# Patient Record
Sex: FEMALE | Race: WHITE
[De-identification: names, ages, dates, MRNs, and addresses within clinical notes are randomized per-mention and may not be internally consistent; named-entity substitution may affect disease eponyms.]

---

## 2019-04-23 ENCOUNTER — APPOINTMENT (OUTPATIENT)
Dept: ANTEPARTUM | Facility: CLINIC | Age: 34
End: 2019-04-23
Payer: COMMERCIAL

## 2019-04-23 PROBLEM — Z00.00 ENCOUNTER FOR PREVENTIVE HEALTH EXAMINATION: Status: ACTIVE | Noted: 2019-04-23

## 2019-04-23 PROCEDURE — 76814 OB US NUCHAL MEAS ADD-ON: CPT

## 2019-04-23 PROCEDURE — 36415 COLL VENOUS BLD VENIPUNCTURE: CPT

## 2019-04-23 PROCEDURE — 76817 TRANSVAGINAL US OBSTETRIC: CPT

## 2019-04-23 PROCEDURE — 76813 OB US NUCHAL MEAS 1 GEST: CPT

## 2019-05-21 ENCOUNTER — APPOINTMENT (OUTPATIENT)
Dept: ANTEPARTUM | Facility: CLINIC | Age: 34
End: 2019-05-21
Payer: COMMERCIAL

## 2019-05-21 PROCEDURE — 76817 TRANSVAGINAL US OBSTETRIC: CPT

## 2019-05-21 PROCEDURE — 76812 OB US DETAILED ADDL FETUS: CPT | Mod: 59

## 2019-05-21 PROCEDURE — 76811 OB US DETAILED SNGL FETUS: CPT

## 2019-07-01 ENCOUNTER — APPOINTMENT (OUTPATIENT)
Dept: ANTEPARTUM | Facility: CLINIC | Age: 34
End: 2019-07-01
Payer: COMMERCIAL

## 2019-07-01 PROCEDURE — 76817 TRANSVAGINAL US OBSTETRIC: CPT

## 2019-07-01 PROCEDURE — 76810 OB US >/= 14 WKS ADDL FETUS: CPT

## 2019-07-01 PROCEDURE — 76805 OB US >/= 14 WKS SNGL FETUS: CPT

## 2019-07-31 ENCOUNTER — APPOINTMENT (OUTPATIENT)
Dept: ANTEPARTUM | Facility: CLINIC | Age: 34
End: 2019-07-31
Payer: COMMERCIAL

## 2019-07-31 PROCEDURE — 76820 UMBILICAL ARTERY ECHO: CPT | Mod: 59

## 2019-07-31 PROCEDURE — 76819 FETAL BIOPHYS PROFIL W/O NST: CPT

## 2019-07-31 PROCEDURE — 76816 OB US FOLLOW-UP PER FETUS: CPT

## 2019-08-14 ENCOUNTER — APPOINTMENT (OUTPATIENT)
Dept: ANTEPARTUM | Facility: CLINIC | Age: 34
End: 2019-08-14
Payer: COMMERCIAL

## 2019-08-14 PROCEDURE — 76816 OB US FOLLOW-UP PER FETUS: CPT

## 2019-08-14 PROCEDURE — 76817 TRANSVAGINAL US OBSTETRIC: CPT

## 2019-08-14 PROCEDURE — 76819 FETAL BIOPHYS PROFIL W/O NST: CPT | Mod: 59

## 2019-08-28 ENCOUNTER — APPOINTMENT (OUTPATIENT)
Dept: ANTEPARTUM | Facility: CLINIC | Age: 34
End: 2019-08-28
Payer: COMMERCIAL

## 2019-08-28 PROCEDURE — 76819 FETAL BIOPHYS PROFIL W/O NST: CPT | Mod: 59

## 2019-08-28 PROCEDURE — 76817 TRANSVAGINAL US OBSTETRIC: CPT

## 2019-08-28 PROCEDURE — 76816 OB US FOLLOW-UP PER FETUS: CPT | Mod: 59

## 2019-09-11 ENCOUNTER — APPOINTMENT (OUTPATIENT)
Dept: ANTEPARTUM | Facility: CLINIC | Age: 34
End: 2019-09-11
Payer: COMMERCIAL

## 2019-09-11 PROCEDURE — 76816 OB US FOLLOW-UP PER FETUS: CPT

## 2019-09-18 ENCOUNTER — APPOINTMENT (OUTPATIENT)
Dept: ANTEPARTUM | Facility: CLINIC | Age: 34
End: 2019-09-18
Payer: COMMERCIAL

## 2019-09-18 PROCEDURE — 76817 TRANSVAGINAL US OBSTETRIC: CPT

## 2019-09-18 PROCEDURE — 76816 OB US FOLLOW-UP PER FETUS: CPT

## 2019-09-21 ENCOUNTER — INPATIENT (INPATIENT)
Facility: HOSPITAL | Age: 34
LOS: 4 days | Discharge: HOME | End: 2019-09-26
Attending: OBSTETRICS & GYNECOLOGY | Admitting: OBSTETRICS & GYNECOLOGY
Payer: COMMERCIAL

## 2019-09-21 ENCOUNTER — RESULT REVIEW (OUTPATIENT)
Age: 34
End: 2019-09-21

## 2019-09-21 VITALS — WEIGHT: 159.39 LBS | HEIGHT: 62 IN

## 2019-09-21 DIAGNOSIS — Z3A.00 WEEKS OF GESTATION OF PREGNANCY NOT SPECIFIED: ICD-10-CM

## 2019-09-21 DIAGNOSIS — O26.899 OTHER SPECIFIED PREGNANCY RELATED CONDITIONS, UNSPECIFIED TRIMESTER: ICD-10-CM

## 2019-09-21 LAB
ALBUMIN SERPL ELPH-MCNC: 3.3 G/DL — SIGNIFICANT CHANGE UP (ref 3.3–5)
ALP SERPL-CCNC: 205 U/L — HIGH (ref 40–120)
ALT FLD-CCNC: 21 U/L — SIGNIFICANT CHANGE UP (ref 10–45)
AMNISURE ROM (RUPTURE OF MEMBRANES): POSITIVE
ANION GAP SERPL CALC-SCNC: 12 MMOL/L — SIGNIFICANT CHANGE UP (ref 5–17)
APPEARANCE UR: CLEAR — SIGNIFICANT CHANGE UP
APTT BLD: 26.3 SEC — LOW (ref 27.5–36.3)
AST SERPL-CCNC: 26 U/L — SIGNIFICANT CHANGE UP (ref 10–40)
BASOPHILS # BLD AUTO: 0.04 K/UL — SIGNIFICANT CHANGE UP (ref 0–0.2)
BASOPHILS NFR BLD AUTO: 0.4 % — SIGNIFICANT CHANGE UP (ref 0–2)
BILIRUB SERPL-MCNC: 0.2 MG/DL — SIGNIFICANT CHANGE UP (ref 0.2–1.2)
BILIRUB UR-MCNC: NEGATIVE — SIGNIFICANT CHANGE UP
BLD GP AB SCN SERPL QL: NEGATIVE — SIGNIFICANT CHANGE UP
BUN SERPL-MCNC: 7 MG/DL — SIGNIFICANT CHANGE UP (ref 7–23)
CALCIUM SERPL-MCNC: 9.2 MG/DL — SIGNIFICANT CHANGE UP (ref 8.4–10.5)
CHLORIDE SERPL-SCNC: 106 MMOL/L — SIGNIFICANT CHANGE UP (ref 96–108)
CO2 SERPL-SCNC: 19 MMOL/L — LOW (ref 22–31)
COLOR SPEC: YELLOW — SIGNIFICANT CHANGE UP
CREAT ?TM UR-MCNC: 129 MG/DL — SIGNIFICANT CHANGE UP
CREAT SERPL-MCNC: 0.56 MG/DL — SIGNIFICANT CHANGE UP (ref 0.5–1.3)
DIFF PNL FLD: NEGATIVE — SIGNIFICANT CHANGE UP
EOSINOPHIL # BLD AUTO: 0.09 K/UL — SIGNIFICANT CHANGE UP (ref 0–0.5)
EOSINOPHIL NFR BLD AUTO: 1 % — SIGNIFICANT CHANGE UP (ref 0–6)
FIBRINOGEN PPP-MCNC: 446 MG/DL — HIGH (ref 258–438)
GLUCOSE SERPL-MCNC: 88 MG/DL — SIGNIFICANT CHANGE UP (ref 70–99)
GLUCOSE UR QL: NEGATIVE — SIGNIFICANT CHANGE UP
HCT VFR BLD CALC: 40.4 % — SIGNIFICANT CHANGE UP (ref 34.5–45)
HGB BLD-MCNC: 12.8 G/DL — SIGNIFICANT CHANGE UP (ref 11.5–15.5)
IMM GRANULOCYTES NFR BLD AUTO: 0.3 % — SIGNIFICANT CHANGE UP (ref 0–1.5)
INR BLD: 0.96 — SIGNIFICANT CHANGE UP (ref 0.88–1.16)
KETONES UR-MCNC: NEGATIVE — SIGNIFICANT CHANGE UP
LDH SERPL L TO P-CCNC: 155 U/L — SIGNIFICANT CHANGE UP (ref 50–242)
LEUKOCYTE ESTERASE UR-ACNC: NEGATIVE — SIGNIFICANT CHANGE UP
LYMPHOCYTES # BLD AUTO: 1.69 K/UL — SIGNIFICANT CHANGE UP (ref 1–3.3)
LYMPHOCYTES # BLD AUTO: 18.4 % — SIGNIFICANT CHANGE UP (ref 13–44)
MAGNESIUM SERPL-MCNC: 4.6 MG/DL — HIGH (ref 1.6–2.6)
MCHC RBC-ENTMCNC: 25 PG — LOW (ref 27–34)
MCHC RBC-ENTMCNC: 31.7 GM/DL — LOW (ref 32–36)
MCV RBC AUTO: 78.9 FL — LOW (ref 80–100)
MONOCYTES # BLD AUTO: 0.79 K/UL — SIGNIFICANT CHANGE UP (ref 0–0.9)
MONOCYTES NFR BLD AUTO: 8.6 % — SIGNIFICANT CHANGE UP (ref 2–14)
NEUTROPHILS # BLD AUTO: 6.53 K/UL — SIGNIFICANT CHANGE UP (ref 1.8–7.4)
NEUTROPHILS NFR BLD AUTO: 71.3 % — SIGNIFICANT CHANGE UP (ref 43–77)
NITRITE UR-MCNC: NEGATIVE — SIGNIFICANT CHANGE UP
NRBC # BLD: 0 /100 WBCS — SIGNIFICANT CHANGE UP (ref 0–0)
PH UR: 6 — SIGNIFICANT CHANGE UP (ref 5–8)
PLATELET # BLD AUTO: 186 K/UL — SIGNIFICANT CHANGE UP (ref 150–400)
POTASSIUM SERPL-MCNC: 3.9 MMOL/L — SIGNIFICANT CHANGE UP (ref 3.5–5.3)
POTASSIUM SERPL-SCNC: 3.9 MMOL/L — SIGNIFICANT CHANGE UP (ref 3.5–5.3)
PROT ?TM UR-MCNC: 15 MG/DL — HIGH (ref 0–12)
PROT SERPL-MCNC: 6.3 G/DL — SIGNIFICANT CHANGE UP (ref 6–8.3)
PROT UR-MCNC: NEGATIVE MG/DL — SIGNIFICANT CHANGE UP
PROT/CREAT UR-RTO: 0.1 RATIO — SIGNIFICANT CHANGE UP (ref 0–0.2)
PROTHROM AB SERPL-ACNC: 10.8 SEC — SIGNIFICANT CHANGE UP (ref 10–12.9)
RBC # BLD: 5.12 M/UL — SIGNIFICANT CHANGE UP (ref 3.8–5.2)
RBC # FLD: 16.1 % — HIGH (ref 10.3–14.5)
RH IG SCN BLD-IMP: POSITIVE — SIGNIFICANT CHANGE UP
SODIUM SERPL-SCNC: 137 MMOL/L — SIGNIFICANT CHANGE UP (ref 135–145)
SP GR SPEC: 1.02 — SIGNIFICANT CHANGE UP (ref 1–1.03)
T PALLIDUM AB TITR SER: NEGATIVE — SIGNIFICANT CHANGE UP
URATE SERPL-MCNC: 5.1 MG/DL — SIGNIFICANT CHANGE UP (ref 2.5–7)
UROBILINOGEN FLD QL: 0.2 E.U./DL — SIGNIFICANT CHANGE UP
WBC # BLD: 9.17 K/UL — SIGNIFICANT CHANGE UP (ref 3.8–10.5)
WBC # FLD AUTO: 9.17 K/UL — SIGNIFICANT CHANGE UP (ref 3.8–10.5)

## 2019-09-21 RX ORDER — NALOXONE HYDROCHLORIDE 4 MG/.1ML
0.1 SPRAY NASAL
Refills: 0 | Status: DISCONTINUED | OUTPATIENT
Start: 2019-09-21 | End: 2019-09-21

## 2019-09-21 RX ORDER — CEFAZOLIN SODIUM 1 G
2000 VIAL (EA) INJECTION ONCE
Refills: 0 | Status: COMPLETED | OUTPATIENT
Start: 2019-09-21 | End: 2019-09-21

## 2019-09-21 RX ORDER — OXYTOCIN 10 UNIT/ML
333.33 VIAL (ML) INJECTION
Qty: 20 | Refills: 0 | Status: DISCONTINUED | OUTPATIENT
Start: 2019-09-21 | End: 2019-09-26

## 2019-09-21 RX ORDER — NIFEDIPINE 30 MG
30 TABLET, EXTENDED RELEASE 24 HR ORAL DAILY
Refills: 0 | Status: DISCONTINUED | OUTPATIENT
Start: 2019-09-21 | End: 2019-09-22

## 2019-09-21 RX ORDER — SODIUM CHLORIDE 9 MG/ML
1000 INJECTION, SOLUTION INTRAVENOUS ONCE
Refills: 0 | Status: COMPLETED | OUTPATIENT
Start: 2019-09-21 | End: 2019-09-21

## 2019-09-21 RX ORDER — SODIUM CHLORIDE 9 MG/ML
1000 INJECTION, SOLUTION INTRAVENOUS
Refills: 0 | Status: DISCONTINUED | OUTPATIENT
Start: 2019-09-21 | End: 2019-09-22

## 2019-09-21 RX ORDER — METOCLOPRAMIDE HCL 10 MG
10 TABLET ORAL ONCE
Refills: 0 | Status: DISCONTINUED | OUTPATIENT
Start: 2019-09-21 | End: 2019-09-21

## 2019-09-21 RX ORDER — OXYCODONE HYDROCHLORIDE 5 MG/1
5 TABLET ORAL ONCE
Refills: 0 | Status: DISCONTINUED | OUTPATIENT
Start: 2019-09-21 | End: 2019-09-26

## 2019-09-21 RX ORDER — LANOLIN
1 OINTMENT (GRAM) TOPICAL EVERY 6 HOURS
Refills: 0 | Status: DISCONTINUED | OUTPATIENT
Start: 2019-09-21 | End: 2019-09-26

## 2019-09-21 RX ORDER — DOCUSATE SODIUM 100 MG
100 CAPSULE ORAL
Refills: 0 | Status: DISCONTINUED | OUTPATIENT
Start: 2019-09-21 | End: 2019-09-26

## 2019-09-21 RX ORDER — LABETALOL HCL 100 MG
20 TABLET ORAL ONCE
Refills: 0 | Status: COMPLETED | OUTPATIENT
Start: 2019-09-21 | End: 2019-09-21

## 2019-09-21 RX ORDER — MORPHINE SULFATE 50 MG/1
0.2 CAPSULE, EXTENDED RELEASE ORAL ONCE
Refills: 0 | Status: DISCONTINUED | OUTPATIENT
Start: 2019-09-21 | End: 2019-09-21

## 2019-09-21 RX ORDER — IBUPROFEN 200 MG
600 TABLET ORAL EVERY 6 HOURS
Refills: 0 | Status: COMPLETED | OUTPATIENT
Start: 2019-09-21 | End: 2020-08-19

## 2019-09-21 RX ORDER — DIPHENHYDRAMINE HCL 50 MG
25 CAPSULE ORAL EVERY 6 HOURS
Refills: 0 | Status: DISCONTINUED | OUTPATIENT
Start: 2019-09-21 | End: 2019-09-26

## 2019-09-21 RX ORDER — PENICILLIN G POTASSIUM 5000000 [IU]/1
5 POWDER, FOR SOLUTION INTRAMUSCULAR; INTRAPLEURAL; INTRATHECAL; INTRAVENOUS ONCE
Refills: 0 | Status: COMPLETED | OUTPATIENT
Start: 2019-09-21 | End: 2019-09-21

## 2019-09-21 RX ORDER — ONDANSETRON 8 MG/1
4 TABLET, FILM COATED ORAL EVERY 6 HOURS
Refills: 0 | Status: DISCONTINUED | OUTPATIENT
Start: 2019-09-21 | End: 2019-09-26

## 2019-09-21 RX ORDER — MAGNESIUM SULFATE 500 MG/ML
2 VIAL (ML) INJECTION
Qty: 40 | Refills: 0 | Status: DISCONTINUED | OUTPATIENT
Start: 2019-09-21 | End: 2019-09-22

## 2019-09-21 RX ORDER — PENICILLIN G POTASSIUM 5000000 [IU]/1
2.5 POWDER, FOR SOLUTION INTRAMUSCULAR; INTRAPLEURAL; INTRATHECAL; INTRAVENOUS EVERY 4 HOURS
Refills: 0 | Status: DISCONTINUED | OUTPATIENT
Start: 2019-09-21 | End: 2019-09-21

## 2019-09-21 RX ORDER — SIMETHICONE 80 MG/1
80 TABLET, CHEWABLE ORAL EVERY 4 HOURS
Refills: 0 | Status: DISCONTINUED | OUTPATIENT
Start: 2019-09-21 | End: 2019-09-26

## 2019-09-21 RX ORDER — PENICILLIN G POTASSIUM 5000000 [IU]/1
POWDER, FOR SOLUTION INTRAMUSCULAR; INTRAPLEURAL; INTRATHECAL; INTRAVENOUS
Refills: 0 | Status: DISCONTINUED | OUTPATIENT
Start: 2019-09-21 | End: 2019-09-21

## 2019-09-21 RX ORDER — HEPARIN SODIUM 5000 [USP'U]/ML
5000 INJECTION INTRAVENOUS; SUBCUTANEOUS EVERY 12 HOURS
Refills: 0 | Status: DISCONTINUED | OUTPATIENT
Start: 2019-09-22 | End: 2019-09-26

## 2019-09-21 RX ORDER — ACETAMINOPHEN 500 MG
975 TABLET ORAL
Refills: 0 | Status: DISCONTINUED | OUTPATIENT
Start: 2019-09-21 | End: 2019-09-26

## 2019-09-21 RX ORDER — CITRIC ACID/SODIUM CITRATE 300-500 MG
30 SOLUTION, ORAL ORAL ONCE
Refills: 0 | Status: COMPLETED | OUTPATIENT
Start: 2019-09-21 | End: 2019-09-21

## 2019-09-21 RX ORDER — OXYTOCIN 10 UNIT/ML
333.33 VIAL (ML) INJECTION
Qty: 20 | Refills: 0 | Status: DISCONTINUED | OUTPATIENT
Start: 2019-09-21 | End: 2019-09-21

## 2019-09-21 RX ORDER — LABETALOL HCL 100 MG
100 TABLET ORAL EVERY 8 HOURS
Refills: 0 | Status: DISCONTINUED | OUTPATIENT
Start: 2019-09-21 | End: 2019-09-22

## 2019-09-21 RX ORDER — SODIUM CHLORIDE 9 MG/ML
1000 INJECTION, SOLUTION INTRAVENOUS
Refills: 0 | Status: DISCONTINUED | OUTPATIENT
Start: 2019-09-21 | End: 2019-09-21

## 2019-09-21 RX ORDER — MAGNESIUM HYDROXIDE 400 MG/1
30 TABLET, CHEWABLE ORAL
Refills: 0 | Status: DISCONTINUED | OUTPATIENT
Start: 2019-09-21 | End: 2019-09-26

## 2019-09-21 RX ORDER — AZITHROMYCIN 500 MG/1
500 TABLET, FILM COATED ORAL ONCE
Refills: 0 | Status: COMPLETED | OUTPATIENT
Start: 2019-09-21 | End: 2019-09-21

## 2019-09-21 RX ORDER — GLYCERIN ADULT
1 SUPPOSITORY, RECTAL RECTAL AT BEDTIME
Refills: 0 | Status: DISCONTINUED | OUTPATIENT
Start: 2019-09-21 | End: 2019-09-26

## 2019-09-21 RX ORDER — FERROUS SULFATE 325(65) MG
325 TABLET ORAL DAILY
Refills: 0 | Status: DISCONTINUED | OUTPATIENT
Start: 2019-09-21 | End: 2019-09-26

## 2019-09-21 RX ORDER — DEXAMETHASONE 0.5 MG/5ML
4 ELIXIR ORAL EVERY 6 HOURS
Refills: 0 | Status: DISCONTINUED | OUTPATIENT
Start: 2019-09-21 | End: 2019-09-21

## 2019-09-21 RX ORDER — TETANUS TOXOID, REDUCED DIPHTHERIA TOXOID AND ACELLULAR PERTUSSIS VACCINE, ADSORBED 5; 2.5; 8; 8; 2.5 [IU]/.5ML; [IU]/.5ML; UG/.5ML; UG/.5ML; UG/.5ML
0.5 SUSPENSION INTRAMUSCULAR ONCE
Refills: 0 | Status: DISCONTINUED | OUTPATIENT
Start: 2019-09-21 | End: 2019-09-26

## 2019-09-21 RX ORDER — KETOROLAC TROMETHAMINE 30 MG/ML
30 SYRINGE (ML) INJECTION EVERY 6 HOURS
Refills: 0 | Status: DISCONTINUED | OUTPATIENT
Start: 2019-09-21 | End: 2019-09-22

## 2019-09-21 RX ORDER — MAGNESIUM SULFATE 500 MG/ML
4 VIAL (ML) INJECTION ONCE
Refills: 0 | Status: COMPLETED | OUTPATIENT
Start: 2019-09-21 | End: 2019-09-21

## 2019-09-21 RX ORDER — FAMOTIDINE 10 MG/ML
20 INJECTION INTRAVENOUS ONCE
Refills: 0 | Status: COMPLETED | OUTPATIENT
Start: 2019-09-21 | End: 2019-09-21

## 2019-09-21 RX ORDER — OXYCODONE HYDROCHLORIDE 5 MG/1
5 TABLET ORAL
Refills: 0 | Status: COMPLETED | OUTPATIENT
Start: 2019-09-21 | End: 2019-09-28

## 2019-09-21 RX ADMIN — Medication 30 MILLILITER(S): at 09:49

## 2019-09-21 RX ADMIN — PENICILLIN G POTASSIUM 100 MILLION UNIT(S): 5000000 POWDER, FOR SOLUTION INTRAMUSCULAR; INTRAPLEURAL; INTRATHECAL; INTRAVENOUS at 07:15

## 2019-09-21 RX ADMIN — Medication 200 GRAM(S): at 14:10

## 2019-09-21 RX ADMIN — FAMOTIDINE 20 MILLIGRAM(S): 10 INJECTION INTRAVENOUS at 09:35

## 2019-09-21 RX ADMIN — Medication 100 MILLIGRAM(S): at 09:37

## 2019-09-21 RX ADMIN — SODIUM CHLORIDE 2000 MILLILITER(S): 9 INJECTION, SOLUTION INTRAVENOUS at 07:27

## 2019-09-21 RX ADMIN — Medication 12 MILLIGRAM(S): at 06:56

## 2019-09-21 RX ADMIN — AZITHROMYCIN 255 MILLIGRAM(S): 500 TABLET, FILM COATED ORAL at 09:48

## 2019-09-21 RX ADMIN — Medication 975 MILLIGRAM(S): at 21:26

## 2019-09-21 RX ADMIN — Medication 20 MILLIGRAM(S): at 13:55

## 2019-09-21 RX ADMIN — Medication 50 GM/HR: at 14:35

## 2019-09-21 RX ADMIN — Medication 30 MILLIGRAM(S): at 19:30

## 2019-09-21 RX ADMIN — Medication 975 MILLIGRAM(S): at 23:00

## 2019-09-21 RX ADMIN — SODIUM CHLORIDE 1000 MILLILITER(S): 9 INJECTION, SOLUTION INTRAVENOUS at 06:56

## 2019-09-21 NOTE — PROGRESS NOTE ADULT - SUBJECTIVE AND OBJECTIVE BOX
Patient evaluated at bedside for clinical magnesium check. Serum magnesium to be drawn at 20:00 and was found to be 4.6.  She denies visual disturbances including scotoma, headache and right upper quadrant pain. Also denies nausea/vomiting/epigastric pain/shortness of breath. Pain well controlled.      T(C): 36.4 (19 @ 01:30), Max: 36.6 (19 @ 21:28)  HR: 75 (19 @ 03:30) (75 - 100)  BP: 99/64 (19 @ 03:30) (99/64 - 124/71)  RR: 16 (19 @ 03:30) (16 - 19)  SpO2: 95% (19 @ 03:30) (95% - 96%)  Wt(kg): --  Daily Height in cm: 157.48 (21 Sep 2019 07:33)    Daily Weight Pre-pregnancy in k (21 Sep 2019 07:33)     @ 07:01  -   @ 04:23  --------------------------------------------------------  IN: 2450 mL / OUT: 4775 mL / NET: -2325 mL      Gen: NAD, AAOx3  CV: RRR, no M/R/G  Pulm: CTAB, no R/R/W  Abd: soft, nontender, no rebound or guarding, no epigastric tenderness, liver nonpalpable +BS, fundus palpated at umbilicus.  : Carreno in place, mildly bloody urine but good output  Ext: +1 edema becca, SCDs in place, Reflexes +2                          12.8   9.17  )-----------( 186      ( 21 Sep 2019 06:36 )             40.4         137  |  106  |  7   ----------------------------<  88  3.9   |  19<L>  |  0.56    Ca    9.2      21 Sep 2019 06:36  Mg     4.6         TPro  6.3  /  Alb  3.3  /  TBili  0.2  /  DBili  x   /  AST  26  /  ALT  21  /  AlkPhos  205<H>      acetaminophen   Tablet .. 975 milliGRAM(s) Oral <User Schedule>  diphenhydrAMINE 25 milliGRAM(s) Oral every 6 hours PRN  diphtheria/tetanus/pertussis (acellular) Vaccine (ADAcel) 0.5 milliLiter(s) IntraMuscular once  docusate sodium 100 milliGRAM(s) Oral two times a day PRN  ferrous    sulfate 325 milliGRAM(s) Oral daily  glycerin Suppository - Adult 1 Suppository(s) Rectal at bedtime PRN  heparin  Injectable 5000 Unit(s) SubCutaneous every 12 hours  ibuprofen  Tablet. 600 milliGRAM(s) Oral every 6 hours  ketorolac   Injectable 30 milliGRAM(s) IV Push every 6 hours  labetalol 100 milliGRAM(s) Oral every 8 hours  lactated ringers. 1000 milliLiter(s) IV Continuous <Continuous>  lanolin Ointment 1 Application(s) Topical every 6 hours PRN  magnesium hydroxide Suspension 30 milliLiter(s) Oral two times a day PRN  magnesium sulfate Infusion 2 Gm/Hr IV Continuous <Continuous>  NIFEdipine XL 30 milliGRAM(s) Oral daily  ondansetron Injectable 4 milliGRAM(s) IV Push every 6 hours PRN  oxyCODONE    IR 5 milliGRAM(s) Oral every 3 hours PRN  oxyCODONE    IR 5 milliGRAM(s) Oral once PRN  oxytocin Infusion 333.333 milliUNIT(s)/Min IV Continuous <Continuous>  prenatal multivitamin 1 Tablet(s) Oral daily  simethicone 80 milliGRAM(s) Chew every 4 hours PRN      19 @ 07:  -  19 @ 07:00  --------------------------------------------------------  IN: 2000 mL / OUT: 0 mL / NET: 2000 mL    19 @ 07:01  -  19 @ 04:23  --------------------------------------------------------  IN: 2450 mL / OUT: 4775 mL / NET: -9957 mL

## 2019-09-21 NOTE — CONSULT NOTE ADULT - ASSESSMENT
patient is a 34 y o female  who was admitted 2nd to premature rupture of membranes. She delivered twins via C section today.   Nephrology consult placed in regards to preeclampsia with severe features.    Preeclampsia with severe features  s/p IV labetalol + immediate release nifedipine   on mag drip   s/p c section 2nd to rupture of membranes  recommend starting patient on labetalol 100 mg po q 8hrs with holding parameters (do not five if BP < 120/80)  in the meantime, recommend nifedipine 30 mg po daily  goal -140/80-90  will continue monitoring BP

## 2019-09-21 NOTE — PROGRESS NOTE ADULT - SUBJECTIVE AND OBJECTIVE BOX
patient seen in recovery room for severe range bps. patient denies HA, scotoma, RUQ/epigastric pain. reflexes WNL. Will give IV push labetalol 20 mg now , start IV Mg for seizure prophylaxis, consult nephrology.     Vital Signs Last 24 Hrs  T(C): 36.6 (21 Sep 2019 12:30), Max: 36.6 (21 Sep 2019 12:30)  T(F): 97.9 (21 Sep 2019 12:30), Max: 97.9 (21 Sep 2019 12:30)  HR: 90 (21 Sep 2019 13:15) (84 - 92)  BP: 161/83 (21 Sep 2019 13:15) (152/80 - 161/83)  BP(mean): --  RR: 16 (21 Sep 2019 13:15) (16 - 17)  SpO2: 95% (21 Sep 2019 13:15) (92% - 96%)    no ruq tenderness  reflexes 2+ bilaterally

## 2019-09-21 NOTE — PROGRESS NOTE ADULT - ASSESSMENT
A&P:   34y  s/p c/s  complicated by preeclampsia with severe features    1. Preeclampsia: Continue IV Magnesium @2G/hr for 24 hrs post delivery.  No complaints currently.   Antihypertensives: Labet  Continue to monitor blood pressures  Next Magnesium check @   Follow up on Magnesium serum level     2. GI: Clears, until Magnesium is stopped    3. : strict Is and Os, D/C verma after discontinuation of IV Magnesium A&P:   34y  s/p c/s  complicated by preeclampsia with severe features    1. Preeclampsia: Continue IV Magnesium @2G/hr for 24 hrs post delivery.  No complaints currently.   Antihypertensives: Labetalol 100 TID, Nifedipine 30XL daily  Continue to monitor blood pressures  Next Magnesium check @ 2:00  Follow up on Magnesium serum level     2. GI: Clears, until Magnesium is stopped    3. : strict Is and Os, D/C verma after discontinuation of IV Magnesium

## 2019-09-21 NOTE — CONSULT NOTE ADULT - SUBJECTIVE AND OBJECTIVE BOX
Renal consult  patient is a 34 y o female  who was admitted 2nd to premature rupture of membranes. She delivered twins via C section today.   Nephrology consult placed in regards to preeclampsia with severe features.  per Ob team, Bp readings were good up until post delivery, when it started rising in the 170s.   She did receive labetalol 20 mg IV @ 1:55 pm  when Bp @ 170/81, then 20 mg IV @ 2:25 pm when BP @ 169/70  She also received nifedipine 30 mg immediate release   most recent Bp @ 121/63.  Patient denies any prior h.o HTN or preeclampsia even during her prior pregnancy.   She denies fh of preeclampsia.   She denies headaches, blurry vision, RUQ abd pain,   She has been started on mag drip.   she denies smoking  She is opting for breastfeeding          PAST MEDICAL & SURGICAL HISTORY:      Allergies    No Known Allergies    Intolerances        FAMILY HISTORY:NA       SOCIAL HISTORY: NA     MEDICATIONS  (STANDING):  acetaminophen   Tablet .. 975 milliGRAM(s) Oral <User Schedule>  diphtheria/tetanus/pertussis (acellular) Vaccine (ADAcel) 0.5 milliLiter(s) IntraMuscular once  ferrous    sulfate 325 milliGRAM(s) Oral daily  ibuprofen  Tablet. 600 milliGRAM(s) Oral every 6 hours  ketorolac   Injectable 30 milliGRAM(s) IV Push every 6 hours  labetalol 100 milliGRAM(s) Oral every 8 hours  lactated ringers. 1000 milliLiter(s) (125 mL/Hr) IV Continuous <Continuous>  magnesium sulfate Infusion 2 Gm/Hr (50 mL/Hr) IV Continuous <Continuous>  morphine PF Spinal 0.2 milliGRAM(s) IntraThecal. once  NIFEdipine XL 30 milliGRAM(s) Oral daily  oxytocin Infusion 333.333 milliUNIT(s)/Min (1000 mL/Hr) IV Continuous <Continuous>  prenatal multivitamin 1 Tablet(s) Oral daily    MEDICATIONS  (PRN):  dexamethasone  Injectable 4 milliGRAM(s) IV Push every 6 hours PRN Nausea  diphenhydrAMINE 25 milliGRAM(s) Oral every 6 hours PRN Itching  docusate sodium 100 milliGRAM(s) Oral two times a day PRN Stool softening  glycerin Suppository - Adult 1 Suppository(s) Rectal at bedtime PRN Constipation  lanolin Ointment 1 Application(s) Topical every 6 hours PRN Sore Nipples  magnesium hydroxide Suspension 30 milliLiter(s) Oral two times a day PRN Constipation  naloxone Injectable 0.1 milliGRAM(s) IV Push every 3 minutes PRN For ANY of the following changes in patient status:  A. Breaths Per Minute LESS THAN 10, B. Oxygen saturation LESS THAN 90%, C. Sedation score of 6 for Stop After: 4 Times  ondansetron Injectable 4 milliGRAM(s) IV Push every 6 hours PRN Nausea  oxyCODONE    IR 5 milliGRAM(s) Oral every 3 hours PRN Moderate to Severe Pain (4-10)  oxyCODONE    IR 5 milliGRAM(s) Oral once PRN Moderate to Severe Pain (4-10)  simethicone 80 milliGRAM(s) Chew every 4 hours PRN Gas      REVIEW OF SYSTEMS:    CONSTITUTIONAL: No fever or chills, No fatigue or tiredness.  EYES: No blurred or double vision.  ENT: No recent URI or sore throat  RESPIRATORY: No shortness of breath, cough, hemoptysis  CARDIOVASCULAR: No Chest pain or shortness of breath  GASTROINTESTINAL: NO abdominal or flank pain, No nausea or vomiting, No diarrhea  GENITOURINARY: No dysuria or urinary burning, No difficulty passing urine, No hematuria  NEUROLOGICAL: No headaches or blurred vision  SKIN: No skin rashes   MUSCULOSKELETAL: No arthralgia, Joint pain, leg edema, No muscle pains        PHYSICAL EXAM: /63,   GENERAL: NAD,   HEAD:  Atraumatic, Normocephalic,   EYES: Bilateral conjunctiva and sclera normal,  Oral cavity: Oral mucosa dry and pink  NECK: Neck supple, No JVD  CHEST/LUNG: Clear to auscultation bilaterally; No wheeze, no rales, no crepitations  HEART: Regular rate and rhythm. HEMA II/VI at LPSB, No gallop, no rub   ABDOMEN: Soft, Nontender, BS+nt, No flank tenderness.   EXTREMITIES: edema   Neurology: AAOx3, no focal neurological deficit  SKIN: No rashes or lesions      CAPILLARY BLOOD GLUCOSE          I&O's Summary    20 Sep 2019 07:01  -  21 Sep 2019 07:00  --------------------------------------------------------  IN: 2000 mL / OUT: 0 mL / NET: 2000 mL    21 Sep 2019 07:  -  21 Sep 2019 16:58  --------------------------------------------------------  IN: 1850 mL / OUT: 2550 mL / NET: -700 mL          LABS:                                                   19 @ 06:36    137  |  106  |  7   ----------------------------<  88  3.9   |  19<L>  |  0.56    Ca    9.2      21 Sep 2019 06:36    TPro  6.3  /  Alb  3.3  /  TBili  0.2  /  DBili  x   /  AST  26  /  ALT    /  AlkPhos  205<H>                            12.8   9.17  )-----------( 186      ( 21 Sep 2019 06:36 )             40.4     CBC Full  -  ( 21 Sep 2019 06:36 )  WBC Count : 9.17 K/uL  Hemoglobin : 12.8 g/dL  Hematocrit : 40.4 %  Platelet Count - Automated : 186 K/uL  Mean Cell Volume : 78.9 fl        PT/INR - ( 21 Sep 2019 06:37 )   PT: 10.8 sec;   INR: 0.96          PTT - ( 21 Sep 2019 06:37 )  PTT:26.3 sec      Urinalysis Basic - ( 21 Sep 2019 07:45 )    Color: Yellow / Appearance: Clear / S.025 / pH: x  Gluc: x / Ketone: NEGATIVE  / Bili: Negative / Urobili: 0.2 E.U./dL   Blood: x / Protein: NEGATIVE mg/dL / Nitrite: NEGATIVE   Leuk Esterase: NEGATIVE / RBC: x / WBC x   Sq Epi: x / Non Sq Epi: x / Bacteria: x        RADIOLOGY & ADDITIONAL TESTS:    EKG/Telemetry: Reviewed

## 2019-09-22 LAB
BASOPHILS # BLD AUTO: 0.03 K/UL — SIGNIFICANT CHANGE UP (ref 0–0.2)
BASOPHILS NFR BLD AUTO: 0.2 % — SIGNIFICANT CHANGE UP (ref 0–2)
EOSINOPHIL # BLD AUTO: 0 K/UL — SIGNIFICANT CHANGE UP (ref 0–0.5)
EOSINOPHIL NFR BLD AUTO: 0 % — SIGNIFICANT CHANGE UP (ref 0–6)
HCT VFR BLD CALC: 33.6 % — LOW (ref 34.5–45)
HGB BLD-MCNC: 10.7 G/DL — LOW (ref 11.5–15.5)
IMM GRANULOCYTES NFR BLD AUTO: 0.5 % — SIGNIFICANT CHANGE UP (ref 0–1.5)
LYMPHOCYTES # BLD AUTO: 1.53 K/UL — SIGNIFICANT CHANGE UP (ref 1–3.3)
LYMPHOCYTES # BLD AUTO: 9.6 % — LOW (ref 13–44)
MAGNESIUM SERPL-MCNC: 6.4 MG/DL — HIGH (ref 1.6–2.6)
MCHC RBC-ENTMCNC: 24.7 PG — LOW (ref 27–34)
MCHC RBC-ENTMCNC: 31.8 GM/DL — LOW (ref 32–36)
MCV RBC AUTO: 77.6 FL — LOW (ref 80–100)
MONOCYTES # BLD AUTO: 1.22 K/UL — HIGH (ref 0–0.9)
MONOCYTES NFR BLD AUTO: 7.7 % — SIGNIFICANT CHANGE UP (ref 2–14)
NEUTROPHILS # BLD AUTO: 13.08 K/UL — HIGH (ref 1.8–7.4)
NEUTROPHILS NFR BLD AUTO: 82 % — HIGH (ref 43–77)
NRBC # BLD: 0 /100 WBCS — SIGNIFICANT CHANGE UP (ref 0–0)
PLATELET # BLD AUTO: 184 K/UL — SIGNIFICANT CHANGE UP (ref 150–400)
RBC # BLD: 4.33 M/UL — SIGNIFICANT CHANGE UP (ref 3.8–5.2)
RBC # FLD: 16.1 % — HIGH (ref 10.3–14.5)
WBC # BLD: 15.94 K/UL — HIGH (ref 3.8–10.5)
WBC # FLD AUTO: 15.94 K/UL — HIGH (ref 3.8–10.5)

## 2019-09-22 PROCEDURE — 99222 1ST HOSP IP/OBS MODERATE 55: CPT | Mod: GC

## 2019-09-22 RX ORDER — IBUPROFEN 200 MG
600 TABLET ORAL EVERY 6 HOURS
Refills: 0 | Status: DISCONTINUED | OUTPATIENT
Start: 2019-09-22 | End: 2019-09-24

## 2019-09-22 RX ADMIN — Medication 600 MILLIGRAM(S): at 18:15

## 2019-09-22 RX ADMIN — SIMETHICONE 80 MILLIGRAM(S): 80 TABLET, CHEWABLE ORAL at 17:45

## 2019-09-22 RX ADMIN — Medication 600 MILLIGRAM(S): at 17:45

## 2019-09-22 RX ADMIN — HEPARIN SODIUM 5000 UNIT(S): 5000 INJECTION INTRAVENOUS; SUBCUTANEOUS at 17:44

## 2019-09-22 RX ADMIN — SIMETHICONE 80 MILLIGRAM(S): 80 TABLET, CHEWABLE ORAL at 06:26

## 2019-09-22 RX ADMIN — Medication 30 MILLIGRAM(S): at 01:45

## 2019-09-22 RX ADMIN — Medication 975 MILLIGRAM(S): at 23:35

## 2019-09-22 RX ADMIN — Medication 975 MILLIGRAM(S): at 21:57

## 2019-09-22 RX ADMIN — Medication 325 MILLIGRAM(S): at 10:05

## 2019-09-22 RX ADMIN — HEPARIN SODIUM 5000 UNIT(S): 5000 INJECTION INTRAVENOUS; SUBCUTANEOUS at 06:26

## 2019-09-22 RX ADMIN — Medication 30 MILLIGRAM(S): at 11:09

## 2019-09-22 RX ADMIN — Medication 975 MILLIGRAM(S): at 10:51

## 2019-09-22 RX ADMIN — Medication 100 MILLIGRAM(S): at 06:26

## 2019-09-22 RX ADMIN — Medication 975 MILLIGRAM(S): at 17:03

## 2019-09-22 RX ADMIN — Medication 1 TABLET(S): at 10:05

## 2019-09-22 RX ADMIN — Medication 975 MILLIGRAM(S): at 16:31

## 2019-09-22 RX ADMIN — Medication 30 MILLIGRAM(S): at 07:41

## 2019-09-22 RX ADMIN — Medication 30 MILLIGRAM(S): at 06:26

## 2019-09-22 RX ADMIN — Medication 975 MILLIGRAM(S): at 10:06

## 2019-09-22 RX ADMIN — Medication 30 MILLIGRAM(S): at 11:45

## 2019-09-22 RX ADMIN — Medication 30 MILLIGRAM(S): at 00:20

## 2019-09-22 NOTE — PROGRESS NOTE ADULT - ASSESSMENT
A/P  34y  s/p , POD #1, stable    #PEC with SFs   -on magnesium for 24 hours  -continue mg checks q6 hours   -asymptomatic currently     - Pain: Motrin or oxycodone prn  - GI: Reg diet  - : verma  - DVT prophylaxis: SQH 5000u q12  - Dispo: POD3 or 4    Patient seen by Radha PGY2

## 2019-09-22 NOTE — PROGRESS NOTE ADULT - SUBJECTIVE AND OBJECTIVE BOX
Patient is a 34y Female seen and evaluated at bedside.   pt seen and examined  no complaints  bp reviewed- for the most part, running low   denies any headaches, blurry vision, RUQ abd pain     acetaminophen   Tablet .. 975 <User Schedule>  diphenhydrAMINE 25 every 6 hours PRN  diphtheria/tetanus/pertussis (acellular) Vaccine (ADAcel) 0.5 once  docusate sodium 100 two times a day PRN  ferrous    sulfate 325 daily  glycerin Suppository - Adult 1 at bedtime PRN  heparin  Injectable 5000 every 12 hours  ibuprofen  Tablet. 600 every 6 hours  labetalol 100 every 8 hours  lanolin Ointment 1 every 6 hours PRN  magnesium hydroxide Suspension 30 two times a day PRN  NIFEdipine XL 30 daily  ondansetron Injectable 4 every 6 hours PRN  oxyCODONE    IR 5 every 3 hours PRN  oxyCODONE    IR 5 once PRN  oxytocin Infusion 333.333 <Continuous>  prenatal multivitamin 1 daily  simethicone 80 every 4 hours PRN      Allergies    No Known Allergies    Intolerances        T(C): , Max: 37.1 (19 @ 11:56)  T(F): , Max: 98.7 (19 @ 11:56)  HR: 70 (19 @ 11:56)  BP: 101/67 (19 @ 11:56)  BP(mean): --  RR: 17 (19 @ 11:56)  SpO2: 97% (19 @ 11:56)  Wt(kg): --     @ 07:01  -   @ 07:00  --------------------------------------------------------  IN: 3648 mL / OUT: 6150 mL / NET: -2502 mL     @ 07:01  -   @ 14:22  --------------------------------------------------------  IN: 0 mL / OUT: 900 mL / NET: -900 mL          Review of Systems:  CONSTITUTIONAL: No fever or chills, No fatigue or tiredness.  EYES: No blurred or double vision.  RESPIRATORY: No shortness of breath, cough, hemoptysis  CARDIOVASCULAR: No Chest pain or shortness of breath  GASTROINTESTINAL: NO abdominal or flank pain, No nausea or vomiting, No diarrhea  GENITOURINARY: No dysuria or urinary burning, No difficulty passing urine, No hematuria  NEUROLOGICAL: No headaches or blurred vision  SKIN: No skin rashes   MUSCULOSKELETAL: No arthralgia, Joint pain, leg edema, No muscle pains      PHYSICAL EXAM:  GENERAL: NAD,   HEAD:  Atraumatic, Normocephalic,   EYES: Bilateral conjunctiva and sclera normal,  Oral cavity: Oral mucosa dry and pink  NECK: Neck supple, No JVD  CHEST/LUNG: Clear to auscultation bilaterally; No wheeze, no rales, no crepitations  HEART: Regular rate and rhythm. HEMA II/VI at LPSB, No gallop, no rub   ABDOMEN: Soft, Nontender, BS+nt, No flank tenderness.   EXTREMITIES: edema   Neurology: AAOx3, no focal neurological deficit  SKIN: No rashes or lesions        LABS:                        10.7   15.94 )-----------( 184      ( 22 Sep 2019 05:01 )             33.6         137  |  106  |  7   ----------------------------<  88  3.9   |  19<L>  |  0.56    Ca    9.2      21 Sep 2019 06:36  Mg     6.4         TPro  6.3  /  Alb  3.3  /  TBili  0.2  /  DBili  x   /  AST  26  /  ALT  21  /  AlkPhos  205<H>        PT/INR - ( 21 Sep 2019 06:37 )   PT: 10.8 sec;   INR: 0.96          PTT - ( 21 Sep 2019 06:37 )  PTT:26.3 sec  Urinalysis Basic - ( 21 Sep 2019 07:45 )    Color: Yellow / Appearance: Clear / S.025 / pH: x  Gluc: x / Ketone: NEGATIVE  / Bili: Negative / Urobili: 0.2 E.U./dL   Blood: x / Protein: NEGATIVE mg/dL / Nitrite: NEGATIVE   Leuk Esterase: NEGATIVE / RBC: x / WBC x   Sq Epi: x / Non Sq Epi: x / Bacteria: x      Creatinine, Random Urine: 129 mg/dL ( @ 07:45)  Protein/Creatinine Ratio Calculation: 0.1 Ratio ( @ 07:45)        RADIOLOGY & ADDITIONAL STUDIES:

## 2019-09-22 NOTE — PROGRESS NOTE ADULT - ASSESSMENT
patient is a 34 y o female  who was admitted 2nd to premature rupture of membranes. She delivered twins via C section today.   Nephrology consult placed in regards to preeclampsia with severe features.    Preeclampsia with severe features post delivery   sp c section on   s/p IV labetalol + immediate release nifedipine   on mag drip until this am   BP readings reviewed for the most part, recommend stopping all anti HTN meds  will continue monitoring BP patient is a 34 y o female  who was admitted 2nd to premature rupture of membranes. She delivered twins via C section today.   Nephrology consult placed in regards to preeclampsia with severe features.    Preeclampsia with severe features post delivery   sp c section on   s/p IV labetalol + immediate release nifedipine   on mag drip until this am   BP readings reviewed for the most part, recommend tapering all anti HTN meds and holding for low BP   will continue monitoring BP

## 2019-09-22 NOTE — PROGRESS NOTE ADULT - SUBJECTIVE AND OBJECTIVE BOX
S: Pt evaluated at bedside . She denies visual disturbances, headache and epigastric or right upper quadrant pain. Also denies nausea/vomiting/shortness of breath. Pain well controlled. Feels drowsy since mg started     O:  T(C): 36.6 (09-22-19 @ 07:49), Max: 36.6 (09-22-19 @ 07:49)  HR: 72 (09-22-19 @ 07:49) (69 - 84)  BP: 95/68 (09-22-19 @ 07:49) (95/68 - 105/64)  RR: 17 (09-22-19 @ 07:49) (16 - 19)  SpO2: 93% (09-22-19 @ 07:49) (93% - 96%)  Wt(kg): --  Daily     Daily     09-21 @ 07:01  -  09-22 @ 07:00  --------------------------------------------------------  IN: 3648 mL / OUT: 6150 mL / NET: -2502 mL    09-22 @ 07:01  -  09-22 @ 09:39  --------------------------------------------------------  IN: 0 mL / OUT: 100 mL / NET: -100 mL        Gen: NAD, AAOx3  CV: RRR, no M/R/G  Pulm: CTAB, no R/R/W  Abd: soft, nontender, no rebound or guarding, no epigastric tenderness, liver nonpalpable +BS.   : Ev in  Ext: +1 edema becca, SCDs in place, Reflexes 2+    acetaminophen   Tablet .. 975 milliGRAM(s) Oral <User Schedule>  diphenhydrAMINE 25 milliGRAM(s) Oral every 6 hours PRN  diphtheria/tetanus/pertussis (acellular) Vaccine (ADAcel) 0.5 milliLiter(s) IntraMuscular once  docusate sodium 100 milliGRAM(s) Oral two times a day PRN  ferrous    sulfate 325 milliGRAM(s) Oral daily  glycerin Suppository - Adult 1 Suppository(s) Rectal at bedtime PRN  heparin  Injectable 5000 Unit(s) SubCutaneous every 12 hours  ibuprofen  Tablet. 600 milliGRAM(s) Oral every 6 hours  ketorolac   Injectable 30 milliGRAM(s) IV Push every 6 hours  labetalol 100 milliGRAM(s) Oral every 8 hours  lactated ringers. 1000 milliLiter(s) IV Continuous <Continuous>  lanolin Ointment 1 Application(s) Topical every 6 hours PRN  magnesium hydroxide Suspension 30 milliLiter(s) Oral two times a day PRN  magnesium sulfate Infusion 2 Gm/Hr IV Continuous <Continuous>  NIFEdipine XL 30 milliGRAM(s) Oral daily  ondansetron Injectable 4 milliGRAM(s) IV Push every 6 hours PRN  oxyCODONE    IR 5 milliGRAM(s) Oral every 3 hours PRN  oxyCODONE    IR 5 milliGRAM(s) Oral once PRN  oxytocin Infusion 333.333 milliUNIT(s)/Min IV Continuous <Continuous>  prenatal multivitamin 1 Tablet(s) Oral daily  simethicone 80 milliGRAM(s) Chew every 4 hours PRN    No Known Allergies                            10.7   15.94 )-----------( 184      ( 22 Sep 2019 05:01 )             33.6     09-21    137  |  106  |  7   ----------------------------<  88  3.9   |  19<L>  |  0.56    Ca    9.2      21 Sep 2019 06:36  Mg     6.4     09-22    TPro  6.3  /  Alb  3.3  /  TBili  0.2  /  DBili  x   /  AST  26  /  ALT  21  /  AlkPhos  205<H>  09-21

## 2019-09-23 PROCEDURE — 99233 SBSQ HOSP IP/OBS HIGH 50: CPT

## 2019-09-23 RX ADMIN — Medication 600 MILLIGRAM(S): at 19:12

## 2019-09-23 RX ADMIN — Medication 100 MILLIGRAM(S): at 12:36

## 2019-09-23 RX ADMIN — HEPARIN SODIUM 5000 UNIT(S): 5000 INJECTION INTRAVENOUS; SUBCUTANEOUS at 18:22

## 2019-09-23 RX ADMIN — Medication 600 MILLIGRAM(S): at 12:36

## 2019-09-23 RX ADMIN — Medication 975 MILLIGRAM(S): at 21:15

## 2019-09-23 RX ADMIN — HEPARIN SODIUM 5000 UNIT(S): 5000 INJECTION INTRAVENOUS; SUBCUTANEOUS at 06:36

## 2019-09-23 RX ADMIN — Medication 325 MILLIGRAM(S): at 12:36

## 2019-09-23 RX ADMIN — Medication 600 MILLIGRAM(S): at 00:15

## 2019-09-23 RX ADMIN — Medication 1 TABLET(S): at 12:36

## 2019-09-23 RX ADMIN — Medication 600 MILLIGRAM(S): at 00:55

## 2019-09-23 RX ADMIN — Medication 600 MILLIGRAM(S): at 06:37

## 2019-09-23 RX ADMIN — Medication 600 MILLIGRAM(S): at 07:40

## 2019-09-23 RX ADMIN — Medication 600 MILLIGRAM(S): at 13:30

## 2019-09-23 RX ADMIN — Medication 975 MILLIGRAM(S): at 09:26

## 2019-09-23 RX ADMIN — Medication 600 MILLIGRAM(S): at 18:22

## 2019-09-23 RX ADMIN — Medication 975 MILLIGRAM(S): at 16:50

## 2019-09-23 RX ADMIN — Medication 975 MILLIGRAM(S): at 15:57

## 2019-09-23 RX ADMIN — Medication 975 MILLIGRAM(S): at 21:52

## 2019-09-23 NOTE — PROGRESS NOTE ADULT - SUBJECTIVE AND OBJECTIVE BOX
patient seen and evaluated at bedside  no acute events overnight  no active complains       Meds:    acetaminophen   Tablet .. 975 <User Schedule>  diphenhydrAMINE 25 every 6 hours PRN  diphtheria/tetanus/pertussis (acellular) Vaccine (ADAcel) 0.5 once  docusate sodium 100 two times a day PRN  ferrous    sulfate 325 daily  glycerin Suppository - Adult 1 at bedtime PRN  heparin  Injectable 5000 every 12 hours  ibuprofen  Tablet. 600 every 6 hours  lanolin Ointment 1 every 6 hours PRN  magnesium hydroxide Suspension 30 two times a day PRN  ondansetron Injectable 4 every 6 hours PRN  oxyCODONE    IR 5 every 3 hours PRN  oxyCODONE    IR 5 once PRN  oxytocin Infusion 333.333 <Continuous>  prenatal multivitamin 1 daily  simethicone 80 every 4 hours PRN      T(C): , Max: 36.7 (09-23-19 @ 02:00)  T(F): , Max: 98.1 (09-23-19 @ 02:00)  HR: 64 (09-23-19 @ 15:00)  BP: 136/79 (09-23-19 @ 15:00)  BP(mean): --  RR: 18 (09-23-19 @ 15:00)  SpO2: 94% (09-23-19 @ 15:00)  Wt(kg): --    09-22 @ 07:01  -  09-23 @ 07:00  --------------------------------------------------------  IN: 0 mL / OUT: 1200 mL / NET: -1200 mL          Review of Systems:  CONSTITUTIONAL: No fever or chills  EYES: No blurred or double vision  RESPIRATORY: No shortness of breath, cough  CARDIOVASCULAR: No Chest pain or shortness of breath  GASTROINTESTINAL: No flank pain, No nausea or vomiting  GENITOURINARY: No dysuria   NEUROLOGICAL: No headaches or blurred vision  SKIN: No skin rashes         PHYSICAL EXAM:  GENERAL: NAD  Oral cavity: Oral mucosa dry and pink  NECK: Neck supple, No JVD  CHEST/LUNG: Clear to auscultation bilaterally  HEART: Regular rate and rhythm  ABDOMEN: Soft, BS+, No flank tenderness  EXTREMITIES: LE edema   Neurology: AAOx3, no focal neurological deficit  SKIN: No rashes        LABS:                        10.7   15.94 )-----------( 184      ( 22 Sep 2019 05:01 )             33.6       Mg     6.4     09-22                  RADIOLOGY & ADDITIONAL STUDIES:

## 2019-09-23 NOTE — PROGRESS NOTE ADULT - ASSESSMENT
34 y o female  who was admitted 2nd to premature rupture of membranes. She delivered twins via C section .   Nephrology consult placed in regards to preeclampsia with severe features.    # Preeclampsia with severe features post delivery   - sp c section on   - s/p 24 hr mag drip   - s/p IV labetalol + immediate release nifedipine   - will continue monitoring BP    - BP goal 130-140/80-90 mmHg

## 2019-09-23 NOTE — PROGRESS NOTE ADULT - SUBJECTIVE AND OBJECTIVE BOX
Patient evaluated at bedside.   She reports pain is well controlled with OPM.  She has been ambulating without assistance, voiding spontaneously, passing gas, tolerating regular diet and is breastfeeding.  Denies any toxic complaints - HA, visual changes, SOB, CP, n/v, epigastric pain, RUQ pain   She denies HA, dizziness, CP, palpitations, SOB, n/v, or heavy vaginal bleeding.    Physical Exam:  Vital Signs Last 24 Hrs  T(C): 36.1 (23 Sep 2019 06:53), Max: 37.1 (22 Sep 2019 11:56)  T(F): 97 (23 Sep 2019 06:53), Max: 98.7 (22 Sep 2019 11:56)  HR: 76 (23 Sep 2019 06:53) (70 - 78)  BP: 156/84 (23 Sep 2019 06:53) (95/68 - 156/84)  BP(mean): --  RR: 17 (23 Sep 2019 06:53) (16 - 18)  SpO2: 97% (23 Sep 2019 06:53) (93% - 98%)    Gen: NAD  Abd: + BS, soft, nontender, nondistended, no rebound or guarding  Incision clean, dry and intact  uterus firm at midline  : lochia WNL  Extremities: no swelling or calf tenderness                          10.7   15.94 )-----------( 184      ( 22 Sep 2019 05:01 )             33.6       Mg     6.4     09-22

## 2019-09-23 NOTE — PROGRESS NOTE ADULT - ASSESSMENT
A/P  34y  s/p , POD #1, stable    #PEC with SFs   -s/p magnesium for 24 hours  -asymptomatic currently   -BPs normal to mild range     - Pain: Motrin or oxycodone prn  - GI: Reg diet  - : voiding   - DVT prophylaxis: SQH 5000u q12  - Dispo: POD3 or 4

## 2019-09-24 LAB
ALBUMIN SERPL ELPH-MCNC: 2.9 G/DL — LOW (ref 3.3–5)
ALP SERPL-CCNC: 182 U/L — HIGH (ref 40–120)
ALT FLD-CCNC: 19 U/L — SIGNIFICANT CHANGE UP (ref 10–45)
ANION GAP SERPL CALC-SCNC: 11 MMOL/L — SIGNIFICANT CHANGE UP (ref 5–17)
AST SERPL-CCNC: 24 U/L — SIGNIFICANT CHANGE UP (ref 10–40)
BASOPHILS # BLD AUTO: 0.04 K/UL — SIGNIFICANT CHANGE UP (ref 0–0.2)
BASOPHILS NFR BLD AUTO: 0.3 % — SIGNIFICANT CHANGE UP (ref 0–2)
BILIRUB SERPL-MCNC: 0.2 MG/DL — SIGNIFICANT CHANGE UP (ref 0.2–1.2)
BUN SERPL-MCNC: 16 MG/DL — SIGNIFICANT CHANGE UP (ref 7–23)
CALCIUM SERPL-MCNC: 8.7 MG/DL — SIGNIFICANT CHANGE UP (ref 8.4–10.5)
CHLORIDE SERPL-SCNC: 106 MMOL/L — SIGNIFICANT CHANGE UP (ref 96–108)
CO2 SERPL-SCNC: 21 MMOL/L — LOW (ref 22–31)
CREAT SERPL-MCNC: 0.62 MG/DL — SIGNIFICANT CHANGE UP (ref 0.5–1.3)
EOSINOPHIL # BLD AUTO: 0.18 K/UL — SIGNIFICANT CHANGE UP (ref 0–0.5)
EOSINOPHIL NFR BLD AUTO: 1.4 % — SIGNIFICANT CHANGE UP (ref 0–6)
GLUCOSE SERPL-MCNC: 83 MG/DL — SIGNIFICANT CHANGE UP (ref 70–99)
HCT VFR BLD CALC: 34.6 % — SIGNIFICANT CHANGE UP (ref 34.5–45)
HGB BLD-MCNC: 11.1 G/DL — LOW (ref 11.5–15.5)
IMM GRANULOCYTES NFR BLD AUTO: 0.6 % — SIGNIFICANT CHANGE UP (ref 0–1.5)
LDH SERPL L TO P-CCNC: 229 U/L — SIGNIFICANT CHANGE UP (ref 50–242)
LYMPHOCYTES # BLD AUTO: 2.54 K/UL — SIGNIFICANT CHANGE UP (ref 1–3.3)
LYMPHOCYTES # BLD AUTO: 20 % — SIGNIFICANT CHANGE UP (ref 13–44)
MCHC RBC-ENTMCNC: 25.2 PG — LOW (ref 27–34)
MCHC RBC-ENTMCNC: 32.1 GM/DL — SIGNIFICANT CHANGE UP (ref 32–36)
MCV RBC AUTO: 78.6 FL — LOW (ref 80–100)
MONOCYTES # BLD AUTO: 0.89 K/UL — SIGNIFICANT CHANGE UP (ref 0–0.9)
MONOCYTES NFR BLD AUTO: 7 % — SIGNIFICANT CHANGE UP (ref 2–14)
NEUTROPHILS # BLD AUTO: 8.94 K/UL — HIGH (ref 1.8–7.4)
NEUTROPHILS NFR BLD AUTO: 70.7 % — SIGNIFICANT CHANGE UP (ref 43–77)
NRBC # BLD: 0 /100 WBCS — SIGNIFICANT CHANGE UP (ref 0–0)
PLATELET # BLD AUTO: 185 K/UL — SIGNIFICANT CHANGE UP (ref 150–400)
POTASSIUM SERPL-MCNC: 4.4 MMOL/L — SIGNIFICANT CHANGE UP (ref 3.5–5.3)
POTASSIUM SERPL-SCNC: 4.4 MMOL/L — SIGNIFICANT CHANGE UP (ref 3.5–5.3)
PROT SERPL-MCNC: 5.6 G/DL — LOW (ref 6–8.3)
RBC # BLD: 4.4 M/UL — SIGNIFICANT CHANGE UP (ref 3.8–5.2)
RBC # FLD: 16.6 % — HIGH (ref 10.3–14.5)
SODIUM SERPL-SCNC: 138 MMOL/L — SIGNIFICANT CHANGE UP (ref 135–145)
URATE SERPL-MCNC: 5.6 MG/DL — SIGNIFICANT CHANGE UP (ref 2.5–7)
WBC # BLD: 12.67 K/UL — HIGH (ref 3.8–10.5)
WBC # FLD AUTO: 12.67 K/UL — HIGH (ref 3.8–10.5)

## 2019-09-24 PROCEDURE — 99232 SBSQ HOSP IP/OBS MODERATE 35: CPT

## 2019-09-24 PROCEDURE — 71045 X-RAY EXAM CHEST 1 VIEW: CPT | Mod: 26

## 2019-09-24 RX ORDER — HYDRALAZINE HCL 50 MG
10 TABLET ORAL ONCE
Refills: 0 | Status: COMPLETED | OUTPATIENT
Start: 2019-09-24 | End: 2019-09-24

## 2019-09-24 RX ORDER — OXYCODONE HYDROCHLORIDE 5 MG/1
5 TABLET ORAL
Refills: 0 | Status: DISCONTINUED | OUTPATIENT
Start: 2019-09-24 | End: 2019-09-26

## 2019-09-24 RX ORDER — NIFEDIPINE 30 MG
30 TABLET, EXTENDED RELEASE 24 HR ORAL EVERY 24 HOURS
Refills: 0 | Status: DISCONTINUED | OUTPATIENT
Start: 2019-09-24 | End: 2019-09-26

## 2019-09-24 RX ORDER — LABETALOL HCL 100 MG
100 TABLET ORAL EVERY 12 HOURS
Refills: 0 | Status: DISCONTINUED | OUTPATIENT
Start: 2019-09-24 | End: 2019-09-25

## 2019-09-24 RX ADMIN — Medication 600 MILLIGRAM(S): at 01:20

## 2019-09-24 RX ADMIN — Medication 975 MILLIGRAM(S): at 03:17

## 2019-09-24 RX ADMIN — Medication 1 TABLET(S): at 13:07

## 2019-09-24 RX ADMIN — Medication 600 MILLIGRAM(S): at 06:04

## 2019-09-24 RX ADMIN — Medication 100 MILLIGRAM(S): at 13:07

## 2019-09-24 RX ADMIN — Medication 975 MILLIGRAM(S): at 10:23

## 2019-09-24 RX ADMIN — Medication 975 MILLIGRAM(S): at 04:00

## 2019-09-24 RX ADMIN — Medication 600 MILLIGRAM(S): at 00:50

## 2019-09-24 RX ADMIN — Medication 975 MILLIGRAM(S): at 16:30

## 2019-09-24 RX ADMIN — Medication 10 MILLIGRAM(S): at 02:47

## 2019-09-24 RX ADMIN — Medication 325 MILLIGRAM(S): at 13:07

## 2019-09-24 RX ADMIN — Medication 30 MILLIGRAM(S): at 15:48

## 2019-09-24 RX ADMIN — HEPARIN SODIUM 5000 UNIT(S): 5000 INJECTION INTRAVENOUS; SUBCUTANEOUS at 06:04

## 2019-09-24 RX ADMIN — Medication 600 MILLIGRAM(S): at 14:00

## 2019-09-24 RX ADMIN — Medication 100 MILLIGRAM(S): at 22:13

## 2019-09-24 RX ADMIN — Medication 100 MILLIGRAM(S): at 11:54

## 2019-09-24 RX ADMIN — OXYCODONE HYDROCHLORIDE 5 MILLIGRAM(S): 5 TABLET ORAL at 21:36

## 2019-09-24 RX ADMIN — Medication 975 MILLIGRAM(S): at 15:33

## 2019-09-24 RX ADMIN — Medication 600 MILLIGRAM(S): at 07:01

## 2019-09-24 RX ADMIN — Medication 600 MILLIGRAM(S): at 13:07

## 2019-09-24 RX ADMIN — HEPARIN SODIUM 5000 UNIT(S): 5000 INJECTION INTRAVENOUS; SUBCUTANEOUS at 18:26

## 2019-09-24 RX ADMIN — Medication 975 MILLIGRAM(S): at 09:23

## 2019-09-24 NOTE — PROGRESS NOTE ADULT - ASSESSMENT
A&P:  34y  POD#3 s/p  c/s  complicated by preeclampsia with severe features  She was previously on Mg for 24 hours and PO medications that were discontinued thanks to normal BP.   Severe range BP overnight along with SOB    PEC w/ SF and possible pulm edema.  -s/p 10mg Hydralazine at 2:47 and the repeat BP was 138/88.    - full labs are normal.  - CXR wnl  - EKG- normal sinus rhythm  - Monitor BP and o2 saturation  - Monitor urine output  - update Nephro for recs A&P:  34y  POD#3 s/p  c/s  complicated by preeclampsia with severe features  She was previously on Mg for 24 hours and PO medications that were discontinued thanks to normal BP.   Severe range BP overnight along with SOB    PEC w/ SF and possible pulm edema.  -s/p 10mg Hydralazine at 2:47 and the repeat BP was 138/88.    - full labs are normal.  - CXR pending   - EKG- normal sinus rhythm  - Monitor BP and o2 saturation  - Monitor urine output  - update Nephro for recs

## 2019-09-24 NOTE — PROGRESS NOTE ADULT - ASSESSMENT
A/P: 34y s/p  section,c/b PEC w/SF (BP) POD#3, stable  -  Pain: tylenol q8hrs, oxycodone for severe pain PRN, no NSAIDS  - PEC w/SF: -156/64-97. Nephrology follow, recommended starting Labetalol 100 BID and Nifedipine 30XL qd  -  Post-operatively labs: post-op Hgb 11.1 , hemodynamically stable, no symptoms of anemia   -  GI: tolerating regular diet, passing gas, colace PRN  -  : s/p verma , urinating without difficulty  -  DVT prophylaxis: encouraged increased ambulation, SCDs, SQH  -  Dispo: pending BP control

## 2019-09-24 NOTE — PROGRESS NOTE ADULT - SUBJECTIVE AND OBJECTIVE BOX
Patient evaluated at bedside, resting comfortable in bed.   She reports pain is well controlled.   She denies headache, dizziness, chest pain, palpitations, shortness of breathe, nausea, vomiting or heavy vaginal bleeding.  She has been ambulating without assistance, voiding spontaneously, passing gas, tolerating regular diet and is breastfeeding.    Physical Exam:  Vital Signs Last 24 Hrs  T(C): 36.7 (24 Sep 2019 18:20), Max: 36.8 (23 Sep 2019 21:47)  T(F): 98.1 (24 Sep 2019 18:20), Max: 98.3 (23 Sep 2019 21:47)  HR: 64 (24 Sep 2019 18:20) (52 - 64)  BP: 144/82 (24 Sep 2019 18:20) (138/88 - 166/94)  BP(mean): --  RR: 18 (24 Sep 2019 18:20) (18 - 18)  SpO2: 97% (24 Sep 2019 18:20) (93% - 98%)    GA: NAD, A+0 x 3  CV: RRR  Pulm: CTAB  Breasts: soft, nontender, no palpable masses  Abd: + BS, soft, nontender, nondistended, no rebound or guarding, incision clean, dry and intact, uterus firm at midline, 1 fb below umbilicus  : lochia WNL  Extremities: no swelling or calf tenderness                             11.1   12.67 )-----------( 185      ( 24 Sep 2019 03:09 )             34.6     09-24    138  |  106  |  16  ----------------------------<  83  4.4   |  21<L>  |  0.62    Ca    8.7      24 Sep 2019 03:09    TPro  5.6<L>  /  Alb  2.9<L>  /  TBili  0.2  /  DBili  x   /  AST  24  /  ALT  19  /  AlkPhos  182<H>  09-24

## 2019-09-24 NOTE — PROGRESS NOTE ADULT - SUBJECTIVE AND OBJECTIVE BOX
patient seen and evaluated at bedside  severe range BP of 166/94, HR 52 overnight at 2:30 am associated with chest tightness and SOB, s/p hydralazine 10 mg IVP  currently denies any active complains   labs and CXR reviewed        Meds:    acetaminophen   Tablet .. 975 milliGRAM(s) Oral <User Schedule>  diphenhydrAMINE 25 milliGRAM(s) Oral every 6 hours PRN  diphtheria/tetanus/pertussis (acellular) Vaccine (ADAcel) 0.5 milliLiter(s) IntraMuscular once  docusate sodium 100 milliGRAM(s) Oral two times a day PRN  ferrous    sulfate 325 milliGRAM(s) Oral daily  glycerin Suppository - Adult 1 Suppository(s) Rectal at bedtime PRN  heparin  Injectable 5000 Unit(s) SubCutaneous every 12 hours  ibuprofen  Tablet. 600 milliGRAM(s) Oral every 6 hours  labetalol 100 milliGRAM(s) Oral every 12 hours  lanolin Ointment 1 Application(s) Topical every 6 hours PRN  magnesium hydroxide Suspension 30 milliLiter(s) Oral two times a day PRN  NIFEdipine XL 30 milliGRAM(s) Oral every 24 hours  ondansetron Injectable 4 milliGRAM(s) IV Push every 6 hours PRN  oxyCODONE    IR 5 milliGRAM(s) Oral every 3 hours PRN  oxyCODONE    IR 5 milliGRAM(s) Oral once PRN  oxytocin Infusion 333.333 milliUNIT(s)/Min IV Continuous <Continuous>  prenatal multivitamin 1 Tablet(s) Oral daily  simethicone 80 milliGRAM(s) Chew every 4 hours PRN      T(C): 36.8 (09-24-19 @ 14:00), Max: 36.9 (09-23-19 @ 18:00)  HR: 57 (09-24-19 @ 14:00) (52 - 64)  BP: 156/97 (09-24-19 @ 14:00) (136/79 - 166/94)  RR: 18 (09-24-19 @ 14:00) (18 - 18)  SpO2: 97% (09-24-19 @ 14:00) (93% - 98%)    Input/Output            ROS:   Gen: no fever  Cardiovascular: no chest pain  Respiratory: no cough, no sputum  Gastrointestinal: no nausea, no vomiting, no change in bowel habits  Neurologic: no headache  : no urinary symptoms        PHYSICAL EXAM:  GENERAL: NAD  Oral cavity: Oral mucosa dry and pink  NECK: Neck supple, No JVD  CHEST/LUNG: Clear to auscultation bilaterally  HEART: Regular rate and rhythm  ABDOMEN: Soft, BS+, No flank tenderness  EXTREMITIES: 1+ pitting LE edema   Neurology: AAOx3, no focal neurological deficit  SKIN: No rashes        LABS:                            11.1   12.67 )-----------( 185      ( 24 Sep 2019 03:09 )             34.6       09-24    138  |  106  |  16  ----------------------------<  83  4.4   |  21<L>  |  0.62    Ca    8.7      24 Sep 2019 03:09    TPro  5.6<L>  /  Alb  2.9<L>  /  TBili  0.2  /  DBili  x   /  AST  24  /  ALT  19  /  AlkPhos  182<H>  09-24                  RADIOLOGY & ADDITIONAL STUDIES:

## 2019-09-24 NOTE — CHART NOTE - NSCHARTNOTEFT_GEN_A_CORE
EKG interpretation     Paged by OBGYN team for EKG interpretation   EKG performed at 2.48 am     Normal sinus rhythm, normal axis, normal intervals, good R wave progression.   Results conveyed to primary team.     Official EKG reading will result tomorrow.     Leanne Ndiaye MD   Cardiology Fellow on call Patient: Gus Regalado    Procedure(s):  Laparoscopic Appendectomy - Wound Class: III-Contaminated    Diagnosis: Appendicitis  Diagnosis Additional Information: No value filed.    Anesthesia Type:   General, ETT     Note:  Airway :Face Mask  Patient transferred to:PACU  Comments: Ana Report: Identifed the Patient, Identified the Reponsible Provider, Reviewed the pertinent medical history, Discussed the surgical course, Reviewed Intra-OP anesthesia mangement and issues during anesthesia, Set expectations for post-procedure period and Allowed opportunity for questions and acknowledgement of understanding      Vitals: (Last set prior to Anesthesia Care Transfer)    CRNA VITALS  7/5/2018 0758 - 7/5/2018 0838      7/5/2018             Pulse: 62    SpO2: 100 %    Resp Rate (observed): 24                Electronically Signed By: CAS Newman CRNA  July 5, 2018  8:38 AM

## 2019-09-24 NOTE — LACTATION INITIAL EVALUATION - LACTATION INTERVENTIONS
Mom has a breast pump at home/initiate dual electric pump routine/initiate skin to skin/initiate hand expression routine

## 2019-09-24 NOTE — PROGRESS NOTE ADULT - ASSESSMENT
A&P:  34y  s/p  c/s  complicated by preeclampsia.  She is was previously on Mg and P.O medications that were discontinued due to normal BP.   Currently presenting with severe range BP and symptoms of SOB and chest tightness in the presence of crackles in her lungs.     - BP: The patient was pushed with 10mg Hydralazine at 2:47 and the repeat BP was 138/88.    - Xray was preformed- initial read: intertitioal lung markings that could be mild pulm edema. final read pending.  - EKG- normal sinus rhythm    PEC w/ SF and possible pulm edema.  - Monitor BP and saturation  - Monitor urine output  - update Nephro for recs, possibly Lasix administration A&P:  34y  s/p  c/s  complicated by preeclampsia.  She was previously on Mg and P.O medications that were discontinued thanks to normal BP.   Currently presenting with severe range BP and symptoms of SOB and chest tightness in the presence of crackles in her lungs.     - BP: The patient was pushed with 10mg Hydralazine at 2:47 and the repeat BP was 138/88.    - full labs are normal.  - Xray was preformed- Initial read: interstitial lung markings that could be mild pulm edema. Final read pending.  - EKG- normal sinus rhythm    PEC w/ SF and possible pulm edema.  - Monitor BP and saturation  - Monitor urine output  - update Nephro for recs, possibly Lasix administration

## 2019-09-24 NOTE — PROGRESS NOTE ADULT - ASSESSMENT
34 y o female  who was admitted 2nd to premature rupture of membranes. She delivered twins via C section .   Nephrology consult placed in regards to preeclampsia with severe features.    # Preeclampsia with severe features post delivery   - sp  on   - s/p 24 hr mag drip and IV labetalol + immediate release nifedipine   - severe range BP of 166/94, HR 52 overnight at 2:30 am associated with chest tightness and SOB, s/p hydralazine 10 mg IVP  - labs and CXR reviewed  - recommend labetalol 100 mg po Q12hr  - BP goal <150/100 mmHg  - if better BP control needed start nifedipine XL 30 mg po qd

## 2019-09-24 NOTE — PROGRESS NOTE ADULT - SUBJECTIVE AND OBJECTIVE BOX
Patient evaluated at bedside for sever range BP of 166/94, HR 52. She reports chest tightness and SOB but denies visual disturbances including scotoma, headache and right upper quadrant pain. Also denies nausea/vomiting/epigastric pain.     Gen: NAD, AAOx3  CV: RRR, no M/R/G  Pulm: Crackles in the bases of both lungs.   Abd: soft, nontender, no rebound or guarding, no epigastric tenderness, liver nonpalpable +BS, fundus palpated   Ext: +1 edema becca, Reflexes +2                          11.1   12.67 )-----------( 185      ( 24 Sep 2019 03:09 )             34.6     09-24    138  |  106  |  16  ----------------------------<  83  4.4   |  21<L>  |  0.62    Ca    8.7      24 Sep 2019 03:09  Mg     6.4     09-22    TPro  5.6<L>  /  Alb  2.9<L>  /  TBili  0.2  /  DBili  x   /  AST  24  /  ALT  19  /  AlkPhos  182<H>  09-24 Patient evaluated at bedside for sever range BP of 166/94, HR 52  followed by another sever range BP at 2:30. She reports chest tightness and SOB but denies visual disturbances including scotoma, headache and right upper quadrant pain. Also denies nausea/vomiting/epigastric pain.     Gen: NAD, AAOx3  CV: RRR, no M/R/G  Pulm: Crackles in the bases of both lungs.   Abd: soft, nontender, no rebound or guarding, no epigastric tenderness, liver nonpalpable +BS, fundus palpated   Ext: +1 edema becca, Reflexes +2                          11.1   12.67 )-----------( 185      ( 24 Sep 2019 03:09 )             34.6     09-24    138  |  106  |  16  ----------------------------<  83  4.4   |  21<L>  |  0.62    Ca    8.7      24 Sep 2019 03:09  Mg     6.4     09-22    TPro  5.6<L>  /  Alb  2.9<L>  /  TBili  0.2  /  DBili  x   /  AST  24  /  ALT  19  /  AlkPhos  182<H>  09-24

## 2019-09-24 NOTE — PROGRESS NOTE ADULT - SUBJECTIVE AND OBJECTIVE BOX
She reports chest tightness is improved since last night, denies SOB, denies denies visual disturbances including scotoma, headache and right upper quadrant pain. Also denies nausea/vomiting/epigastric pain. Overall feels well.     Gen: NAD, AAOx3  CV: RRR  Pulm: CTAB  Abd: soft, nontender, no rebound or guarding, no epigastric tenderness, liver nonpalpable +BS, fundus palpated   Ext: +1 LE edema                           11.1   12.67 )-----------( 185      ( 24 Sep 2019 03:09 )             34.6     09-24    138  |  106  |  16  ----------------------------<  83  4.4   |  21<L>  |  0.62    Ca    8.7      24 Sep 2019 03:09  Mg     6.4     09-22    TPro  5.6<L>  /  Alb  2.9<L>  /  TBili  0.2  /  DBili  x   /  AST  24  /  ALT  19  /  AlkPhos  182<H>  09-24

## 2019-09-24 NOTE — PROGRESS NOTE ADULT - SUBJECTIVE AND OBJECTIVE BOX
Patient evaluated at bedside. No acute events overnight. She reports pain is well controlled with OPM. Adequate urine output. Positive flatus    Physical Exam:  Vital Signs Last 24 Hrs  T(C): 36.3 (24 Sep 2019 10:00), Max: 36.9 (23 Sep 2019 18:00)  T(F): 97.4 (24 Sep 2019 10:00), Max: 98.4 (23 Sep 2019 18:00)  HR: 64 (24 Sep 2019 10:00) (52 - 64)  BP: 146/64 (24 Sep 2019 10:00) (136/79 - 166/94)  BP(mean): --  RR: 18 (24 Sep 2019 10:00) (18 - 18)  SpO2: 98% (24 Sep 2019 10:00) (93% - 98%)    GA: NAD, A+0 x 3  Abd: + BS, soft, nontender, nondistended, no rebound or guarding, uterus firm at midline, 2 fb below umbilicus  Incision clean, dry and intact  : lochia WNL  Extremities: no swelling or calf tenderness                            11.1   12.67 )-----------( 185      ( 24 Sep 2019 03:09 )             34.6     09-24    138  |  106  |  16  ----------------------------<  83  4.4   |  21<L>  |  0.62    Ca    8.7      24 Sep 2019 03:09    TPro  5.6<L>  /  Alb  2.9<L>  /  TBili  0.2  /  DBili  x   /  AST  24  /  ALT  19  /  AlkPhos  182<H>  09-24      A/P  yo 34y s/p c/s, PO, stable    Diet: regular  Pain: OPM  OOB/SCDs/IS  Continue to monitor BP closely  Labetolol 100 TID  Anticipate discharge POD #3 or #4

## 2019-09-25 PROCEDURE — 99232 SBSQ HOSP IP/OBS MODERATE 35: CPT

## 2019-09-25 RX ORDER — SODIUM CHLORIDE 9 MG/ML
3 INJECTION INTRAMUSCULAR; INTRAVENOUS; SUBCUTANEOUS EVERY 8 HOURS
Refills: 0 | Status: DISCONTINUED | OUTPATIENT
Start: 2019-09-25 | End: 2019-09-26

## 2019-09-25 RX ORDER — LABETALOL HCL 100 MG
200 TABLET ORAL EVERY 12 HOURS
Refills: 0 | Status: DISCONTINUED | OUTPATIENT
Start: 2019-09-25 | End: 2019-09-26

## 2019-09-25 RX ADMIN — OXYCODONE HYDROCHLORIDE 5 MILLIGRAM(S): 5 TABLET ORAL at 18:04

## 2019-09-25 RX ADMIN — Medication 975 MILLIGRAM(S): at 14:15

## 2019-09-25 RX ADMIN — Medication 975 MILLIGRAM(S): at 15:10

## 2019-09-25 RX ADMIN — OXYCODONE HYDROCHLORIDE 5 MILLIGRAM(S): 5 TABLET ORAL at 00:42

## 2019-09-25 RX ADMIN — Medication 975 MILLIGRAM(S): at 22:05

## 2019-09-25 RX ADMIN — OXYCODONE HYDROCHLORIDE 5 MILLIGRAM(S): 5 TABLET ORAL at 19:05

## 2019-09-25 RX ADMIN — Medication 100 MILLIGRAM(S): at 09:15

## 2019-09-25 RX ADMIN — Medication 975 MILLIGRAM(S): at 21:42

## 2019-09-25 RX ADMIN — Medication 100 MILLIGRAM(S): at 18:05

## 2019-09-25 RX ADMIN — Medication 1 TABLET(S): at 09:16

## 2019-09-25 RX ADMIN — HEPARIN SODIUM 5000 UNIT(S): 5000 INJECTION INTRAVENOUS; SUBCUTANEOUS at 18:04

## 2019-09-25 RX ADMIN — OXYCODONE HYDROCHLORIDE 5 MILLIGRAM(S): 5 TABLET ORAL at 09:15

## 2019-09-25 RX ADMIN — Medication 975 MILLIGRAM(S): at 06:47

## 2019-09-25 RX ADMIN — Medication 200 MILLIGRAM(S): at 18:05

## 2019-09-25 RX ADMIN — Medication 325 MILLIGRAM(S): at 09:16

## 2019-09-25 RX ADMIN — Medication 30 MILLIGRAM(S): at 14:54

## 2019-09-25 RX ADMIN — HEPARIN SODIUM 5000 UNIT(S): 5000 INJECTION INTRAVENOUS; SUBCUTANEOUS at 06:45

## 2019-09-25 RX ADMIN — SODIUM CHLORIDE 3 MILLILITER(S): 9 INJECTION INTRAMUSCULAR; INTRAVENOUS; SUBCUTANEOUS at 14:15

## 2019-09-25 RX ADMIN — OXYCODONE HYDROCHLORIDE 5 MILLIGRAM(S): 5 TABLET ORAL at 10:10

## 2019-09-25 RX ADMIN — SODIUM CHLORIDE 3 MILLILITER(S): 9 INJECTION INTRAMUSCULAR; INTRAVENOUS; SUBCUTANEOUS at 07:02

## 2019-09-25 NOTE — PROGRESS NOTE ADULT - ASSESSMENT
A/P: 34y s/p  section,c/b PEC w/SF (BP) POD#4, stable  -  Pain: tylenol q8hrs, oxycodone for severe pain PRN, no NSAIDS  - PEC w/SF: s/p magnesium for 24 hours.  Nephrology follows, recommended starting Labetalol 100 BID and Nifedipine 30XL qd  -  Post-operatively labs: post-op Hgb 11.1 , hemodynamically stable, no symptoms of anemia   -  GI: tolerating regular diet, passing gas, colace PRN  -  : voiding  -  DVT prophylaxis: encouraged increased ambulation, SCDs, SQH  -  Dispo: pending BP control

## 2019-09-25 NOTE — PROGRESS NOTE ADULT - ASSESSMENT
34 y o female  who was admitted 2nd to premature rupture of membranes. She delivered twins via C section .   Nephrology consult placed in regards to preeclampsia with severe features.    # Preeclampsia with severe features post delivery   - sp  on   - s/p 24 hr mag drip and IV labetalol + immediate release nifedipine   - labs and CXR reviewed  - BP ranges 143/82 - 156/97 mmHg, HR 57-82/min  - started on nifedipine XL 30 mg po qd, continue  - labetalol 100 mg po Q12hr was increased to 200 mg po Q12hr, please give 200 mg po first dose now  - continue BP monitoring  - BP goal <150/100 mmHg  - avoid NSAID's

## 2019-09-25 NOTE — PROGRESS NOTE ADULT - SUBJECTIVE AND OBJECTIVE BOX
patient seen and evaluated at bedside  BP ranges 143/82 - 156/97 mmHg, HR 57-82/min  started on nifedipine XL 30 mg po qd  labetalol 100 mg po Q12hr was increased to 200 mg po Q12hr, did not receive yet  currently denies any active complains         Meds:    acetaminophen   Tablet .. 975 milliGRAM(s) Oral <User Schedule>  diphenhydrAMINE 25 milliGRAM(s) Oral every 6 hours PRN  diphtheria/tetanus/pertussis (acellular) Vaccine (ADAcel) 0.5 milliLiter(s) IntraMuscular once  docusate sodium 100 milliGRAM(s) Oral two times a day PRN  ferrous    sulfate 325 milliGRAM(s) Oral daily  glycerin Suppository - Adult 1 Suppository(s) Rectal at bedtime PRN  heparin  Injectable 5000 Unit(s) SubCutaneous every 12 hours  labetalol 200 milliGRAM(s) Oral every 12 hours  lanolin Ointment 1 Application(s) Topical every 6 hours PRN  magnesium hydroxide Suspension 30 milliLiter(s) Oral two times a day PRN  NIFEdipine XL 30 milliGRAM(s) Oral every 24 hours  ondansetron Injectable 4 milliGRAM(s) IV Push every 6 hours PRN  oxyCODONE    IR 5 milliGRAM(s) Oral once PRN  oxyCODONE    IR 5 milliGRAM(s) Oral every 3 hours PRN  oxytocin Infusion 333.333 milliUNIT(s)/Min IV Continuous <Continuous>  prenatal multivitamin 1 Tablet(s) Oral daily  simethicone 80 milliGRAM(s) Chew every 4 hours PRN  sodium chloride 0.9% lock flush 3 milliLiter(s) IV Push every 8 hours      T(C): 36.7 (09-25-19 @ 09:15), Max: 37.6 (09-24-19 @ 21:20)  HR: 76 (09-25-19 @ 09:15) (57 - 82)  BP: 126/74 (09-25-19 @ 09:15) (126/74 - 156/97)  RR: 18 (09-25-19 @ 09:15) (16 - 18)  SpO2: 98% (09-25-19 @ 09:15) (96% - 98%)    Input/Output            ROS:   Gen: no fever  Cardiovascular: no chest pain  Respiratory: no cough, no sputum  Gastrointestinal: no nausea, no vomiting, no change in bowel habits  Neurologic: no headache  : no urinary symptoms        PHYSICAL EXAM:  GENERAL: NAD  Oral cavity: Oral mucosa dry and pink  NECK: Neck supple, No JVD  CHEST/LUNG: Clear to auscultation bilaterally  HEART: Regular rate and rhythm  ABDOMEN: Soft, BS+, No flank tenderness  EXTREMITIES: 1+ pitting LE edema   Neurology: AAOx3, no focal neurological deficit  SKIN: No rashes        LABS:                            11.1   12.67 )-----------( 185      ( 24 Sep 2019 03:09 )             34.6       09-24    138  |  106  |  16  ----------------------------<  83  4.4   |  21<L>  |  0.62    Ca    8.7      24 Sep 2019 03:09    TPro  5.6<L>  /  Alb  2.9<L>  /  TBili  0.2  /  DBili  x   /  AST  24  /  ALT  19  /  AlkPhos  182<H>  09-24                  RADIOLOGY & ADDITIONAL STUDIES:

## 2019-09-25 NOTE — PROGRESS NOTE ADULT - ATTENDING COMMENTS
09-21    137  |  106  |  7   ----------------------------<  88  3.9   |  19<L>  |  0.56    Ca    9.2      21 Sep 2019 06:36  Mg     6.4     09-22    TPro  6.3  /  Alb  3.3  /  TBili  0.2  /  DBili  x   /  AST  26  /  ALT  21  /  AlkPhos  205<H>  09-21  CBC Full  -  ( 22 Sep 2019 05:01 )  WBC Count : 15.94 K/uL  RBC Count : 4.33 M/uL  Hemoglobin : 10.7 g/dL  Hematocrit : 33.6 %  Platelet Count - Automated : 184 K/uL  Mean Cell Volume : 77.6 fl  Mean Cell Hemoglobin : 24.7 pg  Mean Cell Hemoglobin Concentration : 31.8 gm/dL  Auto Neutrophil # : 13.08 K/uL  Auto Lymphocyte # : 1.53 K/uL  Auto Monocyte # : 1.22 K/uL  Auto Eosinophil # : 0.00 K/uL  Auto Basophil # : 0.03 K/uL  Auto Neutrophil % : 82.0 %  Auto Lymphocyte % : 9.6 %  Auto Monocyte % : 7.7 %  Auto Eosinophil % : 0.0 %  Auto Basophil % : 0.2 %    pt doing well  dc mag at 24 hrs  reg diet with     flatus
Describes her SOB as improved since this morning, similar to what she was experiencing the last two weeks in her pregnancy, no chest pain/pressure, no acid reflux  No headaches/dizziness. LE edema improving. Postpartum PEC in setting of twin gestation with labile BPs, restarted labetalol 100mg BID
Labetalol reordered in a manner that she's missed doses for almost 20 hours but her BP has been ideally controlled today, recommend decreasing back to 100mg BID not 200mg BID as she doesn't need the extra coverage now with nifedipine on board.
have requite viewed status and examined pt. pt is s/p mg s drip, and appears quite comfortable and asymptomatic at this time, without visual, cns, or referrable liver symptoms.   reflexes not increased .   bp acceptable.  will continue current rx, and have discussed means fo post d/c followup.
pt doing well  off mag  will watch bp  followed by renal
I independently performed the key portions of the evaluation and management service provided. I agree with the above history, physical, and plan which I have reviewed and edited where appropriate. I find Preeclampsia, with HTN now improved. Taper BP meds and hold for hypotension. See full note. (Patient seen earlier in day.)

## 2019-09-25 NOTE — PROGRESS NOTE ADULT - SUBJECTIVE AND OBJECTIVE BOX
Patient evaluated at bedside.   She reports pain is well controlled with OPM.  She has been ambulating without assistance, voiding spontaneously, passing gas, tolerating regular diet and is breastfeeding.    She denies HA, dizziness, CP, palpitations, SOB, n/v, or heavy vaginal bleeding.    Physical Exam:  vs reviewed  Gen: NAD  Abd: + BS, soft, nontender, nondistended, no rebound or guarding  Incision clean, dry and intact  uterus firm at midline,   fb below umbilicus  : lochia WNL  Extremities: no swelling or calf tenderness    pod4 elevated bps, f/u nephro consult

## 2019-09-25 NOTE — PROGRESS NOTE ADULT - SUBJECTIVE AND OBJECTIVE BOX
Patient evaluated at bedside.   Denies toxic complaints   She reports pain is well controlled with OPM.  She has been ambulating without assistance, voiding spontaneously, passing gas, tolerating regular diet and is breastfeeding.    She denies HA, dizziness, CP, palpitations, SOB, n/v, or heavy vaginal bleeding.    Physical Exam:  Vital Signs Last 24 Hrs  T(C): 36.9 (25 Sep 2019 05:41), Max: 37.6 (24 Sep 2019 21:20)  T(F): 98.5 (25 Sep 2019 05:41), Max: 99.7 (24 Sep 2019 21:20)  HR: 72 (25 Sep 2019 05:41) (57 - 82)  BP: 143/82 (25 Sep 2019 05:41) (134/78 - 156/97)  BP(mean): --  RR: 16 (25 Sep 2019 05:41) (16 - 18)  SpO2: 96% (25 Sep 2019 05:41) (96% - 98%)    Gen: NAD  Abd: + BS, soft, nontender, nondistended, no rebound or guarding  Incision clean, dry and intact  uterus firm at midline  : lochia WNL  Extremities: no swelling or calf tenderness                          11.1   12.67 )-----------( 185      ( 24 Sep 2019 03:09 )             34.6     09-24    138  |  106  |  16  ----------------------------<  83  4.4   |  21<L>  |  0.62    Ca    8.7      24 Sep 2019 03:09    TPro  5.6<L>  /  Alb  2.9<L>  /  TBili  0.2  /  DBili  x   /  AST  24  /  ALT  19  /  AlkPhos  182<H>  09-24

## 2019-09-26 ENCOUNTER — APPOINTMENT (OUTPATIENT)
Dept: ANTEPARTUM | Facility: CLINIC | Age: 34
End: 2019-09-26

## 2019-09-26 ENCOUNTER — TRANSCRIPTION ENCOUNTER (OUTPATIENT)
Age: 34
End: 2019-09-26

## 2019-09-26 VITALS — WEIGHT: 114.86 LBS

## 2019-09-26 PROCEDURE — 80053 COMPREHEN METABOLIC PANEL: CPT

## 2019-09-26 PROCEDURE — 90686 IIV4 VACC NO PRSV 0.5 ML IM: CPT

## 2019-09-26 PROCEDURE — 85610 PROTHROMBIN TIME: CPT

## 2019-09-26 PROCEDURE — 85730 THROMBOPLASTIN TIME PARTIAL: CPT

## 2019-09-26 PROCEDURE — 82570 ASSAY OF URINE CREATININE: CPT

## 2019-09-26 PROCEDURE — 36415 COLL VENOUS BLD VENIPUNCTURE: CPT

## 2019-09-26 PROCEDURE — 86900 BLOOD TYPING SEROLOGIC ABO: CPT

## 2019-09-26 PROCEDURE — 88307 TISSUE EXAM BY PATHOLOGIST: CPT

## 2019-09-26 PROCEDURE — 86901 BLOOD TYPING SEROLOGIC RH(D): CPT

## 2019-09-26 PROCEDURE — 84112 EVAL AMNIOTIC FLUID PROTEIN: CPT

## 2019-09-26 PROCEDURE — 84156 ASSAY OF PROTEIN URINE: CPT

## 2019-09-26 PROCEDURE — 85025 COMPLETE CBC W/AUTO DIFF WBC: CPT

## 2019-09-26 PROCEDURE — 84550 ASSAY OF BLOOD/URIC ACID: CPT

## 2019-09-26 PROCEDURE — 81003 URINALYSIS AUTO W/O SCOPE: CPT

## 2019-09-26 PROCEDURE — 83615 LACTATE (LD) (LDH) ENZYME: CPT

## 2019-09-26 PROCEDURE — 86850 RBC ANTIBODY SCREEN: CPT

## 2019-09-26 PROCEDURE — 71045 X-RAY EXAM CHEST 1 VIEW: CPT

## 2019-09-26 PROCEDURE — 85384 FIBRINOGEN ACTIVITY: CPT

## 2019-09-26 PROCEDURE — 83735 ASSAY OF MAGNESIUM: CPT

## 2019-09-26 PROCEDURE — 86780 TREPONEMA PALLIDUM: CPT

## 2019-09-26 PROCEDURE — 99214 OFFICE O/P EST MOD 30 MIN: CPT

## 2019-09-26 PROCEDURE — C1889: CPT

## 2019-09-26 RX ORDER — INFLUENZA VIRUS VACCINE 15; 15; 15; 15 UG/.5ML; UG/.5ML; UG/.5ML; UG/.5ML
0.5 SUSPENSION INTRAMUSCULAR ONCE
Refills: 0 | Status: COMPLETED | OUTPATIENT
Start: 2019-09-26 | End: 2019-09-26

## 2019-09-26 RX ORDER — NIFEDIPINE 30 MG
1 TABLET, EXTENDED RELEASE 24 HR ORAL
Qty: 0 | Refills: 0 | DISCHARGE
Start: 2019-09-26

## 2019-09-26 RX ORDER — LABETALOL HCL 100 MG
1 TABLET ORAL
Qty: 0 | Refills: 0 | DISCHARGE
Start: 2019-09-26

## 2019-09-26 RX ORDER — NIFEDIPINE 30 MG
1 TABLET, EXTENDED RELEASE 24 HR ORAL
Qty: 30 | Refills: 0
Start: 2019-09-26 | End: 2019-10-25

## 2019-09-26 RX ORDER — BENZOYL PEROXIDE MICRONIZED 5.8 %
1 TOWELETTE (EA) TOPICAL
Qty: 0 | Refills: 0 | DISCHARGE

## 2019-09-26 RX ORDER — FOLIC ACID 0.8 MG
1 TABLET ORAL
Qty: 0 | Refills: 0 | DISCHARGE

## 2019-09-26 RX ORDER — LABETALOL HCL 100 MG
1 TABLET ORAL
Qty: 60 | Refills: 0
Start: 2019-09-26 | End: 2019-10-25

## 2019-09-26 RX ADMIN — Medication 975 MILLIGRAM(S): at 03:00

## 2019-09-26 RX ADMIN — INFLUENZA VIRUS VACCINE 0.5 MILLILITER(S): 15; 15; 15; 15 SUSPENSION INTRAMUSCULAR at 15:13

## 2019-09-26 RX ADMIN — Medication 975 MILLIGRAM(S): at 03:42

## 2019-09-26 RX ADMIN — HEPARIN SODIUM 5000 UNIT(S): 5000 INJECTION INTRAVENOUS; SUBCUTANEOUS at 05:53

## 2019-09-26 RX ADMIN — Medication 1 TABLET(S): at 09:29

## 2019-09-26 RX ADMIN — Medication 100 MILLIGRAM(S): at 09:30

## 2019-09-26 RX ADMIN — Medication 975 MILLIGRAM(S): at 09:29

## 2019-09-26 RX ADMIN — Medication 975 MILLIGRAM(S): at 15:03

## 2019-09-26 RX ADMIN — Medication 30 MILLIGRAM(S): at 15:03

## 2019-09-26 RX ADMIN — Medication 200 MILLIGRAM(S): at 05:52

## 2019-09-26 RX ADMIN — Medication 975 MILLIGRAM(S): at 16:05

## 2019-09-26 RX ADMIN — Medication 975 MILLIGRAM(S): at 10:20

## 2019-09-26 RX ADMIN — Medication 100 MILLIGRAM(S): at 15:03

## 2019-09-26 RX ADMIN — Medication 325 MILLIGRAM(S): at 09:29

## 2019-09-26 NOTE — DISCHARGE NOTE OB - PATIENT PORTAL LINK FT
You can access the FollowMyHealth Patient Portal offered by [facility] by registering at the following website: [email or web address]. By joining [facility]’s FollowMyHealth portal, you will also be able to view your health information using other applications (apps) compatible with our system.

## 2019-09-26 NOTE — DISCHARGE NOTE OB - CARE PLAN
Principal Discharge DX:	Postpartum state  Goal:	discharge home  Assessment and plan of treatment:	Take Motrin 600mg every 6 hours and/or tylenol 650mg every 6 hours as needed for pain. Call your OB to schedule a follow up appointment in 1 week. Nothing per vagina until cleared by your OB - no intercourse, douching, tampons, etc.  Call your OB if you experience severe abdominal pain not improved by oral pain medications, heavy bright red vaginal bleeding saturating more than 1 pad per hour, or fever greater than 100.4F. Consider contraception options to be discussed with your OB.  Goal:	BP control  Assessment and plan of treatment:	Follow up with your OB in one week for a Bloodpressure check.  Purchase electronic blood pressure cuff at your local pharmacy. Check blood pressure 3x a day. If bp >160/110, you develop a headache not relieved by tylenol, visual disturbances, or right upper abdominal pain, call your doctor or the hospital, or go to your nearest emergency room. Regular diet, normal activity, nothing in the vagina for 6 weeks--no sex, tampons, tub baths, or swimming pools. Principal Discharge DX:	Postpartum state  Goal:	discharge home  Assessment and plan of treatment:	Take Motrin 600mg every 6 hours and/or tylenol 650mg every 6 hours as needed for pain. Call your OB to schedule a follow up appointment in 2 weeks. Nothing per vagina until cleared by your OB - no intercourse, douching, tampons, etc.  Call your OB if you experience severe abdominal pain not improved by oral pain medications, heavy bright red vaginal bleeding saturating more than 1 pad per hour, or fever greater than 100.4F. Consider contraception options to be discussed with your OB.  Goal:	BP control  Assessment and plan of treatment:	Follow up with your OB in two weeks for a Bloodpressure check.  Purchase electronic blood pressure cuff at your local pharmacy. Check blood pressure 3x a day. If bp >160/110, you develop a headache not relieved by tylenol, visual disturbances, or right upper abdominal pain, call your doctor or the hospital, or go to your nearest emergency room. Regular diet, normal activity, nothing in the vagina for 6 weeks--no sex, tampons, tub baths, or swimming pools. Principal Discharge DX:	Postpartum state  Goal:	discharge home  Assessment and plan of treatment:	Take Motrin 600mg every 6 hours and/or tylenol 650mg every 6 hours as needed for pain. Call your OB to schedule a follow up appointment in 2 weeks. Nothing per vagina until cleared by your OB - no intercourse, douching, tampons, etc.  Call your OB if you experience severe abdominal pain not improved by oral pain medications, heavy bright red vaginal bleeding saturating more than 1 pad per hour, or fever greater than 100.4F. Consider contraception options to be discussed with your OB.  Goal:	BP control  Assessment and plan of treatment:	Follow up with your OB in two weeks for a Bloodpressure check.  Purchase electronic blood pressure cuff at your local pharmacy. Check blood pressure 3x a day. If bp >160/110, you develop a headache not relieved by tylenol, visual disturbances, or right upper abdominal pain, call your doctor or the hospital, or go to your nearest emergency room. If BP greater than or equal to 150/90 call your obstetrician. Regular diet, normal activity, nothing in the vagina for 6 weeks--no sex, tampons, tub baths, or swimming pools. Principal Discharge DX:	Postpartum state  Goal:	discharge home  Assessment and plan of treatment:	Take Motrin 600mg every 6 hours and/or tylenol 650mg every 6 hours as needed for pain. Call your OB to schedule a follow up appointment in 2 weeks. Nothing per vagina until cleared by your OB - no intercourse, douching, tampons, etc.  Call your OB if you experience severe abdominal pain not improved by oral pain medications, heavy bright red vaginal bleeding saturating more than 1 pad per hour, or fever greater than 100.4F. Consider contraception options to be discussed with your OB.  Goal:	BP control  Assessment and plan of treatment:	Follow up with your OB in two weeks for a Blood pressure check.  Purchase electronic blood pressure cuff at your local pharmacy. Check blood pressure 3x a day. If bp >160/110, you develop a headache not relieved by tylenol, visual disturbances, or right upper abdominal pain, call your doctor or the hospital, or go to your nearest emergency room. If BP greater than or equal to 150/90 call your obstetrician. Regular diet, normal activity, nothing in the vagina for 6 weeks--no sex, tampons, tub baths, or swimming pools.

## 2019-09-26 NOTE — DISCHARGE NOTE OB - MEDICATION SUMMARY - MEDICATIONS TO TAKE
I will START or STAY ON the medications listed below when I get home from the hospital:    labetalol 200 mg oral tablet  -- 1 tab(s) by mouth every 12 hours  -- Indication: For hypertension    NIFEdipine 30 mg oral tablet, extended release  -- 1 tab(s) by mouth every 24 hours  -- Indication: For hypertension

## 2019-09-26 NOTE — DIETITIAN INITIAL EVALUATION ADULT. - ENERGY NEEDS
Ht: 62in Stated pre-pregnancy Wt: 115lbs IBW: 110lbs (+/-10%), 105% IBW, BMI: 21.0 kg/m2  Pre-pregnancy weight (52.3 kg) used for calculations as it was within % IBW. Needs calculated for lactation..  Energy: 3972-5945 kcal (25-30cal/kg, + 500kcal)  Protein: 67-77 g pro (0.8-1.0g pro/kg + 25g)  Fluids: 2537-5435 ml (35-40 ml/kg)

## 2019-09-26 NOTE — DISCHARGE NOTE OB - CARE PROVIDER_API CALL
Alessandra Richards)  Obstetrics and Gynecology  [address] [place] [id number]  Phone: [phone]  Fax: [phone]  Follow Up Time:

## 2019-09-26 NOTE — PROGRESS NOTE ADULT - ASSESSMENT
34 y o female  who was admitted 2nd to premature rupture of membranes. She delivered twins via C section .   Nephrology consult placed in regards to preeclampsia with severe features.    # Preeclampsia with severe features post delivery   - s/p  on   - s/p 24 hr mag drip and IV labetalol + immediate release nifedipine   - labs and CXR reviewed  - BP ranges 125/75 - 145/76 mmHg  - on nifedipine XL 30 mg po qd and labetalol 200 mg po Q12hr, continue  - continue BP monitoring  - BP goal <150/100 mmHg  - avoid NSAID's  - discharge plan, check BP at home tid, follow w ObGyn in 1week and Nephrology Dr Ruiz on 10/28/19 at 4:30 pm

## 2019-09-26 NOTE — DIETITIAN INITIAL EVALUATION ADULT. - PERTINENT MEDS FT
labetalol, colace, ferrous sulfate, glycerin suppository, magnesium hydroxide suspension, zofran, oxytocin, prenatal multivitamin, simethicone

## 2019-09-26 NOTE — DISCHARGE NOTE OB - PLAN OF CARE
discharge home Take Motrin 600mg every 6 hours and/or tylenol 650mg every 6 hours as needed for pain. Call your OB to schedule a follow up appointment in 1 week. Nothing per vagina until cleared by your OB - no intercourse, douching, tampons, etc.  Call your OB if you experience severe abdominal pain not improved by oral pain medications, heavy bright red vaginal bleeding saturating more than 1 pad per hour, or fever greater than 100.4F. Consider contraception options to be discussed with your OB. BP control Follow up with your OB in one week for a Bloodpressure check.  Purchase electronic blood pressure cuff at your local pharmacy. Check blood pressure 3x a day. If bp >160/110, you develop a headache not relieved by tylenol, visual disturbances, or right upper abdominal pain, call your doctor or the hospital, or go to your nearest emergency room. Regular diet, normal activity, nothing in the vagina for 6 weeks--no sex, tampons, tub baths, or swimming pools. Take Motrin 600mg every 6 hours and/or tylenol 650mg every 6 hours as needed for pain. Call your OB to schedule a follow up appointment in 2 weeks. Nothing per vagina until cleared by your OB - no intercourse, douching, tampons, etc.  Call your OB if you experience severe abdominal pain not improved by oral pain medications, heavy bright red vaginal bleeding saturating more than 1 pad per hour, or fever greater than 100.4F. Consider contraception options to be discussed with your OB. Follow up with your OB in two weeks for a Bloodpressure check.  Purchase electronic blood pressure cuff at your local pharmacy. Check blood pressure 3x a day. If bp >160/110, you develop a headache not relieved by tylenol, visual disturbances, or right upper abdominal pain, call your doctor or the hospital, or go to your nearest emergency room. Regular diet, normal activity, nothing in the vagina for 6 weeks--no sex, tampons, tub baths, or swimming pools. Follow up with your OB in two weeks for a Bloodpressure check.  Purchase electronic blood pressure cuff at your local pharmacy. Check blood pressure 3x a day. If bp >160/110, you develop a headache not relieved by tylenol, visual disturbances, or right upper abdominal pain, call your doctor or the hospital, or go to your nearest emergency room. If BP greater than or equal to 150/90 call your obstetrician. Regular diet, normal activity, nothing in the vagina for 6 weeks--no sex, tampons, tub baths, or swimming pools. Follow up with your OB in two weeks for a Blood pressure check.  Purchase electronic blood pressure cuff at your local pharmacy. Check blood pressure 3x a day. If bp >160/110, you develop a headache not relieved by tylenol, visual disturbances, or right upper abdominal pain, call your doctor or the hospital, or go to your nearest emergency room. If BP greater than or equal to 150/90 call your obstetrician. Regular diet, normal activity, nothing in the vagina for 6 weeks--no sex, tampons, tub baths, or swimming pools.

## 2019-09-26 NOTE — DISCHARGE NOTE OB - HOSPITAL COURSE
Patient is status post a vaginal delivery c/b PEC w/SF treated with IV magnesium. Started on antihypertensives: labetalol 200 BID, nifedipine 30XL qd.  Patient has met her postpartum milestones appropriately.  Vitals are stable for discharge.

## 2019-09-26 NOTE — PROGRESS NOTE ADULT - ASSESSMENT
A/P: 34y s/p  section,c/b PEC w/SF (BP) POD#5, stable    -  Pain: tylenol q8hrs, oxycodone for severe pain PRN, no NSAIDS  - PEC w/SF: s/p magnesium for 24 hours.  Nephrology follows, continue Labetalol 200 BID and Nifedipine 30XL qd  -  Post-operatively labs: post-op Hgb 11.1 , hemodynamically stable, no symptoms of anemia   -  GI: tolerating regular diet, passing gas, colace PRN  -  : voiding  -  DVT prophylaxis: encouraged increased ambulation, SCDs, SQH  -  Dispo: pending BP control

## 2019-09-26 NOTE — PROGRESS NOTE ADULT - SUBJECTIVE AND OBJECTIVE BOX
patient seen and evaluated at bedside  BP ranges 125/75 - 145/76 mmHg  on nifedipine XL 30 mg po qd and labetalol 200 mg po Q12hr  currently denies any active complains             Meds:    acetaminophen   Tablet .. 975 milliGRAM(s) Oral <User Schedule>  diphenhydrAMINE 25 milliGRAM(s) Oral every 6 hours PRN  diphtheria/tetanus/pertussis (acellular) Vaccine (ADAcel) 0.5 milliLiter(s) IntraMuscular once  docusate sodium 100 milliGRAM(s) Oral two times a day PRN  ferrous    sulfate 325 milliGRAM(s) Oral daily  glycerin Suppository - Adult 1 Suppository(s) Rectal at bedtime PRN  heparin  Injectable 5000 Unit(s) SubCutaneous every 12 hours  labetalol 200 milliGRAM(s) Oral every 12 hours  lanolin Ointment 1 Application(s) Topical every 6 hours PRN  magnesium hydroxide Suspension 30 milliLiter(s) Oral two times a day PRN  NIFEdipine XL 30 milliGRAM(s) Oral every 24 hours  ondansetron Injectable 4 milliGRAM(s) IV Push every 6 hours PRN  oxyCODONE    IR 5 milliGRAM(s) Oral once PRN  oxyCODONE    IR 5 milliGRAM(s) Oral every 3 hours PRN  oxytocin Infusion 333.333 milliUNIT(s)/Min IV Continuous <Continuous>  prenatal multivitamin 1 Tablet(s) Oral daily  simethicone 80 milliGRAM(s) Chew every 4 hours PRN  sodium chloride 0.9% lock flush 3 milliLiter(s) IV Push every 8 hours      T(C): 36.6 (09-26-19 @ 12:07), Max: 37.2 (09-25-19 @ 14:00)  HR: 66 (09-26-19 @ 12:07) (66 - 82)  BP: 113/71 (09-26-19 @ 12:07) (113/71 - 159/83)  RR: 18 (09-26-19 @ 12:07) (17 - 18)  SpO2: 97% (09-26-19 @ 12:07) (96% - 98%)    Input/Output          ROS:   Gen: no fever  Cardiovascular: no chest pain  Respiratory: no cough, no sputum  Gastrointestinal: no nausea, no vomiting, no change in bowel habits  Neurologic: no headache  : no urinary symptoms        PHYSICAL EXAM:  GENERAL: NAD  Oral cavity: Oral mucosa dry and pink  NECK: Neck supple, No JVD  CHEST/LUNG: Clear to auscultation bilaterally  HEART: Regular rate and rhythm  ABDOMEN: Soft, BS+, No flank tenderness  EXTREMITIES: no LE edema bilateral  Neurology: AAOx3, no focal neurological deficit  SKIN: No rashes        LABS:    reviewed                RADIOLOGY & ADDITIONAL STUDIES:

## 2019-09-26 NOTE — PROGRESS NOTE ADULT - SUBJECTIVE AND OBJECTIVE BOX
Patient evaluated at bedside. No acute events overnight. She reports pain is well controlled with OPM. Adequate urine output    Physical Exam:  Vital Signs Last 24 Hrs  T(C): 36.6 (26 Sep 2019 12:07), Max: 36.9 (25 Sep 2019 18:00)  T(F): 97.8 (26 Sep 2019 12:07), Max: 98.4 (25 Sep 2019 18:00)  HR: 66 (26 Sep 2019 12:07) (66 - 82)  BP: 113/71 (26 Sep 2019 12:07) (113/71 - 159/83)  BP(mean): --  RR: 18 (26 Sep 2019 12:07) (17 - 18)  SpO2: 97% (26 Sep 2019 12:07) (96% - 98%)    GA: NAD, A+0 x 3  Abd: + BS, soft, nontender, nondistended, no rebound or guarding, uterus firm at midline, 2 fb below umbilicus  Incision clean, dry and intact  : lochia WNL  Extremities: no swelling or calf tenderness    A/P  yo 34y s/p c/s, POD # 4 ,stable    Diet: regular  Pain: OPM  OOB/SCDs/IS  Discharge home on BP meds, precautions reviewed  Anticipate discharge

## 2019-09-26 NOTE — DIETITIAN INITIAL EVALUATION ADULT. - ADD RECOMMEND
1) Recommend continue regular diet. 2) Encourage PO intake and honor pt's food preferences. 3) Monitor weekly wts.

## 2019-09-26 NOTE — DIETITIAN INITIAL EVALUATION ADULT. - OTHER INFO
34 y.o s/p  section at 34.1 weeks ,c/b PEC w/SF (BP), now PPD#5. Pt plans on breastfeeding exclusively.     Pt seen sitting upright in bed. Pt reports good appetite and intake PTA, eating a variety of foods. Pt reports taking prenatal multivitamins, iron, folic acid supplementation PTA. Confirms NKFA or dietary restrictions. Pt states pre-pregnancy wt of 115lbs and delivered twins at 159lbs; 44lbs adequate wt gain noted . No pain reported at this time. Denies N/V/D/C. +BM . Skin: Edema 1+ b/l ankles noted, otherwise intact. Nutrition education provided on increased nutrient needs w/ lactation, pre-eclampsia nutrition therapy with handouts. Pt receptive and declined to have any nutrition-related questions at this time. Made aware RD remains available. 34 y.o s/p  section at 34.1 weeks ,c/b PEC w/SF (BP), now PPD#5. Pt plans on breastfeeding exclusively.     Pt seen sitting upright in bed. Pt reports good appetite and intake PTA, eating a variety of foods. Pt reports taking prenatal multivitamins, iron, folic acid supplementation PTA. Confirms NKFA or dietary restrictions. Pt states pre-pregnancy wt of 115lbs and delivered twins at 159lbs; 44lbs adequate wt gain throughout pregnancy noted. No pain reported at this time. Denies N/V/D/C. +BM . Skin: Edema 1+ b/l ankles noted, otherwise intact. Nutrition education provided on increased nutrient needs w/ lactation, pre-eclampsia nutrition therapy with handouts. Pt receptive and declined to have any nutrition-related questions at this time. Made aware RD remains available.

## 2019-09-26 NOTE — PROGRESS NOTE ADULT - SUBJECTIVE AND OBJECTIVE BOX
Patient evaluated at bedside.   She reports pain is well controlled with OPM.  She has been ambulating without assistance, voiding spontaneously, passing gas, tolerating regular diet and is breastfeeding.  Denies toxic complaints this morning.   She denies HA, dizziness, CP, palpitations, SOB, n/v, or heavy vaginal bleeding.    Physical Exam:  Vital Signs Last 24 Hrs  T(C): 36.7 (26 Sep 2019 05:42), Max: 37.2 (25 Sep 2019 14:00)  T(F): 98 (26 Sep 2019 05:42), Max: 99 (25 Sep 2019 14:00)  HR: 68 (26 Sep 2019 05:42) (67 - 82)  BP: 141/86 (26 Sep 2019 05:42) (125/75 - 145/76)  BP(mean): --  RR: 17 (26 Sep 2019 05:42) (17 - 18)  SpO2: 96% (26 Sep 2019 05:42) (96% - 98%)    Gen: NAD  Abd: + BS, soft, nontender, nondistended, no rebound or guarding  Incision clean, dry and intact  uterus firm at midline  : lochia WNL  Extremities: no swelling or calf tenderness

## 2019-09-30 LAB — SURGICAL PATHOLOGY STUDY: SIGNIFICANT CHANGE UP

## 2019-10-01 DIAGNOSIS — J81.1 CHRONIC PULMONARY EDEMA: ICD-10-CM

## 2019-10-01 DIAGNOSIS — Z3A.34 34 WEEKS GESTATION OF PREGNANCY: ICD-10-CM

## 2019-11-21 ENCOUNTER — APPOINTMENT (OUTPATIENT)
Dept: NEPHROLOGY | Facility: CLINIC | Age: 34
End: 2019-11-21
Payer: COMMERCIAL

## 2019-11-21 VITALS — DIASTOLIC BLOOD PRESSURE: 60 MMHG | HEART RATE: 76 BPM | SYSTOLIC BLOOD PRESSURE: 100 MMHG

## 2019-11-21 VITALS — HEART RATE: 76 BPM | DIASTOLIC BLOOD PRESSURE: 61 MMHG | SYSTOLIC BLOOD PRESSURE: 106 MMHG

## 2019-11-21 VITALS — DIASTOLIC BLOOD PRESSURE: 61 MMHG | SYSTOLIC BLOOD PRESSURE: 106 MMHG

## 2019-11-21 PROCEDURE — 99243 OFF/OP CNSLTJ NEW/EST LOW 30: CPT

## 2019-11-21 RX ORDER — LABETALOL HYDROCHLORIDE 200 MG/1
200 TABLET, FILM COATED ORAL
Refills: 0 | Status: ACTIVE | COMMUNITY

## 2019-11-21 NOTE — CONSULT LETTER
[Dear  ___] : Dear ~LISA, [Consult Letter:] : I had the pleasure of evaluating your patient, [unfilled]. [Please see my note below.] : Please see my note below. [Consult Closing:] : Thank you very much for allowing me to participate in the care of this patient.  If you have any questions, please do not hesitate to contact me. [FreeTextEntry2] : Princess Cruz, \par  [address]\[place] [address]\par  [FreeTextEntry1] : Her blood pressure was much better at 106/61 mmHg and her exam was without focal findings. She has already dismounted her nifedipine and I instructed her to hold her labetalol for BP < 125/80. In addition, if BP is above that range, she should onlay take 1/2 tablet as needed.\par  I anticipate that she will be able to taper off BP medication soon.\par I will keep you apprised of my findings.  [FreeTextEntry3] : Best personal regards,\par Jacqueline\par \par Jacqueline Srinivasan MD, FACP\par Professor of Medicine\par [facility] [employer] at [facility]\par \par

## 2019-11-21 NOTE — HISTORY OF PRESENT ILLNESS
[FreeTextEntry1] : 35 yo woman here for further evaluation and management of HTN- delivered twins by  on - had pre eclampsia with severe features. \par still on labetalol 200 mg tabs- but taking only 1 daily; Stopped nifedipine.\par No lightheadedness, dizziness, N, V, visual changes. \par nursing; \par just restarted exercising.

## 2019-11-21 NOTE — PHYSICAL EXAM
[General Appearance - Alert] : alert [General Appearance - In No Acute Distress] : in no acute distress [Sclera] : the sclera and conjunctiva were normal [Extraocular Movements] : extraocular movements were intact [Outer Ear] : the ears and nose were normal in appearance [Examination Of The Oral Cavity] : the lips and gums were normal [Neck Appearance] : the appearance of the neck was normal [Neck Cervical Mass (___cm)] : no neck mass was observed [Jugular Venous Distention Increased] : there was no jugular-venous distention [Auscultation Breath Sounds / Voice Sounds] : lungs were clear to auscultation bilaterally [Heart Rate And Rhythm] : heart rate was normal and rhythm regular [Heart Sounds] : normal S1 and S2 [Heart Sounds Gallop] : no gallops [Murmurs] : no murmurs [Heart Sounds Pericardial Friction Rub] : no pericardial rub [Edema] : there was no peripheral edema [Bowel Sounds] : normal bowel sounds [Abdomen Soft] : soft [Abdomen Tenderness] : non-tender [Abdomen Mass (___ Cm)] : no abdominal mass palpated [Cervical Lymph Nodes Enlarged Posterior Bilaterally] : posterior cervical [Cervical Lymph Nodes Enlarged Anterior Bilaterally] : anterior cervical [Supraclavicular Lymph Nodes Enlarged Bilaterally] : supraclavicular [No CVA Tenderness] : no ~M costovertebral angle tenderness [No Spinal Tenderness] : no spinal tenderness [Abnormal Walk] : normal gait [] : no rash [No Focal Deficits] : no focal deficits [Oriented To Time, Place, And Person] : oriented to person, place, and time [Impaired Insight] : insight and judgment were intact [Affect] : the affect was normal

## 2020-03-11 ENCOUNTER — APPOINTMENT (OUTPATIENT)
Dept: NEPHROLOGY | Facility: CLINIC | Age: 35
End: 2020-03-11
Payer: COMMERCIAL

## 2020-03-11 VITALS — SYSTOLIC BLOOD PRESSURE: 110 MMHG | HEART RATE: 74 BPM | DIASTOLIC BLOOD PRESSURE: 70 MMHG

## 2020-03-11 PROCEDURE — 99213 OFFICE O/P EST LOW 20 MIN: CPT

## 2020-03-13 NOTE — HISTORY OF PRESENT ILLNESS
[FreeTextEntry1] : 33 yo woman with  h/o pre eclampsia with severe features - delivered twins by  on - \par when seen here 2019, BP were on low side- dc'd labetalol at that time\par continues off labetalol\par No HA, N, V, visual changes\par feels fine\par \par \par

## 2020-03-13 NOTE — ASSESSMENT
[FreeTextEntry1] : 35 yo woman s/p pre eclampsia- delivered on 9/21/19- residual HTN post partum. Was improved at last OV\par BP remains in normal range\par okay to stay off antihypertensive medications.\par reports now that she may consider another pregnancy next year.\par Discussed that she would be at above average risk of getting pre eclampsia again\par instructed her to see me prior to conception to discuss BP/renal risks, so that she is aware\par \par \par f/u as needed as per above

## 2022-03-15 NOTE — PATIENT PROFILE OB - BELONGINGS, PROFILE
clothing/shoes
Review of Systems:  	•	CONSTITUTIONAL: no fever, no diaphoresis, no chills  	•	SKIN: +R index finger wound  	•	MUSCULOSKELETAL: no joint swelling/redness. no hand pain   	•	NEUROLOGIC: no weakness, no numbness  	•	PSYCH: no anxiety, non suicidal, non homicidal, no hallucination, no depression

## 2024-09-02 NOTE — ASSESSMENT
[FreeTextEntry1] : all [facility] BP, lab and renal records reviewed- placed into EMR-\par 35 yo woman s/p pre eclampsia- delivered on 9/21-\par dc's on labetalol and nifedipine- now only using labetalol 1 daily\par HTN- home BP monitor correlates with my reading here today BP good today- did not take any labetalol- agree-\par Hold unless BP > 125/80 or HR > 96- and then take 1/2 tablet\par Keep me apprised of BP readings.\par Call with concern for elevated or too low a BP-\par anticipate that she will be able to successfully taper off meds over next few days/weeks\par f/u OV 4 months- sooner as needed\par  36.9